# Patient Record
Sex: FEMALE | Race: WHITE | Employment: FULL TIME | URBAN - METROPOLITAN AREA
[De-identification: names, ages, dates, MRNs, and addresses within clinical notes are randomized per-mention and may not be internally consistent; named-entity substitution may affect disease eponyms.]

---

## 2017-01-10 DIAGNOSIS — G89.29 ABDOMINAL PAIN, CHRONIC, GENERALIZED: ICD-10-CM

## 2017-01-10 DIAGNOSIS — G89.29 OTHER CHRONIC PAIN: Primary | ICD-10-CM

## 2017-01-10 DIAGNOSIS — R10.84 ABDOMINAL PAIN, CHRONIC, GENERALIZED: ICD-10-CM

## 2017-01-10 RX ORDER — FENTANYL 25 UG/1
PATCH TRANSDERMAL
Qty: 30 PATCH | Refills: 0 | Status: SHIPPED | OUTPATIENT
Start: 2017-01-10 | End: 2017-01-31

## 2017-01-10 RX ORDER — FENTANYL 50 UG/1
PATCH TRANSDERMAL
Qty: 30 PATCH | Refills: 0 | Status: SHIPPED | OUTPATIENT
Start: 2017-01-10 | End: 2017-01-31

## 2017-01-19 ENCOUNTER — OFFICE VISIT (OUTPATIENT)
Dept: INTERNAL MEDICINE | Facility: CLINIC | Age: 42
End: 2017-01-19

## 2017-01-19 VITALS
WEIGHT: 113 LBS | RESPIRATION RATE: 18 BRPM | DIASTOLIC BLOOD PRESSURE: 80 MMHG | HEART RATE: 85 BPM | BODY MASS INDEX: 19.39 KG/M2 | SYSTOLIC BLOOD PRESSURE: 121 MMHG | TEMPERATURE: 98.1 F | OXYGEN SATURATION: 99 %

## 2017-01-19 DIAGNOSIS — Z79.52 LONG TERM CURRENT USE OF SYSTEMIC STEROIDS: ICD-10-CM

## 2017-01-19 DIAGNOSIS — Z13.820 SCREENING FOR OSTEOPOROSIS: ICD-10-CM

## 2017-01-19 DIAGNOSIS — M85.80 OSTEOPENIA: ICD-10-CM

## 2017-01-19 DIAGNOSIS — K04.7 DENTAL INFECTION: Primary | ICD-10-CM

## 2017-01-19 DIAGNOSIS — K50.118 CROHN'S DISEASE OF LARGE INTESTINE WITH OTHER COMPLICATION (H): ICD-10-CM

## 2017-01-19 DIAGNOSIS — M85.80 BONE LOSS: ICD-10-CM

## 2017-01-19 RX ORDER — FENTANYL 75 UG/1
PATCH TRANSDERMAL
Refills: 0 | COMMUNITY
Start: 2016-10-24 | End: 2017-04-04 | Stop reason: DRUGHIGH

## 2017-01-19 RX ORDER — AMOXICILLIN 500 MG/1
500 TABLET, FILM COATED ORAL 3 TIMES DAILY
Qty: 30 TABLET | Refills: 1 | Status: SHIPPED | OUTPATIENT
Start: 2017-01-19 | End: 2017-02-01

## 2017-01-19 ASSESSMENT — PAIN SCALES - GENERAL: PAINLEVEL: MODERATE PAIN (5)

## 2017-01-19 NOTE — Clinical Note
[facility] ([facility]) Contract: Opioid Prescription  I understand that my provider, ____________________________, is prescribing an opioid medication to assist me in managing my chronic pain and assist me in improving my daily function and activity level. If my activity level or general function changes, the medication may be changed or discontinued. I understand that my provider is not required to prescribe this medication. The risks, side effects, and benefits of taking this medication have been explained to me and I agree to the following conditions related to this treatment. Failure to adhere to these conditions will result in discontinuation of this medication and possible termination of care from the [facility].     I will take my medication as prescribed. I will not change the amount or frequency of the medication without provider approval.    I will only receive these medications from _____________________________________________ (or my provider s designee as determined by my provider).    I am being prescribed opioid medications to treat the following condition(s):________________________________________________.    If I am hospitalized or seen in an emergency department or urgent care for a condition that results in a prescription for another controlled substance (such as anti-anxiety, additional pain medication, sleep aid, or stimulants), I will inform the clinic within one business day of the additional medication.    I will notify the clinic within one business day if I seek any care elsewhere related to this medication.    I will participate in all additional treatments that my provider recommends, such as physical therapy or consultations with a pain or mental health specialist.     I will notify my provider of any history of addiction or current chemical dependence.    I will not use illegal drugs (includes marijuana).     I will comply with random drug screening to confirm  proper use of my medication.    I will comply with state law. I understand that I may not be able to operate a motor vehicle while taking these medications. I will not participate in any activities that will put myself or others at physical risk while taking any medications.    I will not give, sell, or trade my medications to anyone else.      I will not take someone else s medications.    I will not forge or change my prescriptions in any way.    I understand I will have one clinic appointment every __________months (or more frequently) as determined by my provider.    I understand it is my responsibility to schedule future appointments with my provider at the end of each visit.    I will not request early refills. I understand that lost and/or stolen prescriptions/medications will not be replaced.    I will request refills of these medications in the following manner:    o I am responsible to keep track of my medications and plan ahead to arrange for refills. It is my responsibility to ensure that I do not run out of medication.  o I will call [phone] and request a refill of my opioid medication(s).  All other medication refill request should be requested through your pharmacy.  o I will give the clinic one full week notice prior to needing a refill.  o I will not walk in or call into the clinic and request this medication to be refilled same day (I understand I must give a full week notice of the need for refill).  o I understand that these prescriptions will be dispensed monthly with a maximum of one-month supply with no refills.  o Prescriptions will only be available to be picked up during regular office hours (7:30am-5:00pm Monday-Friday). Refills will not be provided at night, on weekends, or during holidays.  I will treat my provider and clinic staff with respect. I will not yell, name-call, shout, or use offensive or vulgar language. I will not threaten or act in an intimidating manner on the  telephone or while in the clinic toward my provider or clinic staff.  I agree if I break this agreement, my physician reserves the right to stop prescribing the controlled substance for me - my medications will be discontinued in a medically safe fashion, and I will not be allowed to get pain medications from any other provider in this clinic and/or it may result in a termination of care from this clinic.       I understand this contract is in effect for all current and future opioid prescriptions received through the [facility].          _________________________________________________________________________                _________________  Signature of Patient or Personal Representative (state relationship)                                        Date    _________________________________________________________________________                 ________________                                              Signature of Provider (also please print name)                                                                               Date

## 2017-01-19 NOTE — NURSING NOTE
Chief Complaint   Patient presents with     Infection     Patient is here to discuss ongoing infection in mouth.      Cookie Armando LPN at 11:09 AM on 1/19/2017.

## 2017-01-19 NOTE — PATIENT INSTRUCTIONS
[facility] Medication Refill Request Information:  * Please contact your pharmacy regarding ANY request for medication refills.  ** [facility] Prescription Fax = [phone]  * Please allow 3 business days for routine medication refills.  * Please allow 5 business days for controlled substance medication refills.     [facility] Test Result notification information:  *You will be notified with in 7-10 days of your appointment day regarding the results of your test.  If you are on MyChart you will be notified as soon as the provider has reviewed the results and signed off on them.

## 2017-01-20 PROBLEM — Z02.89 PAIN MANAGEMENT CONTRACT SIGNED: Status: ACTIVE | Noted: 2017-01-20

## 2017-01-24 NOTE — PROGRESS NOTES
HPI: Tiffani Grubbs is a 41 year old female who comes in 1/19/2017 for mouth infection involving the right bottom molar.  She was told she will need a root canal procedure which is complicated by bone loss of the lower jaw. Her appt is in the future and she was advised to get an antibiotic to treat this until she could get an appt.       Patient Active Problem List   Diagnosis     Crohn's disease (H)     Abdominal pain, chronic, generalized     Anxiety     Depression     Medication refill- do not delete      Nicotine addiction     Thymus hyperplasia (H)     Pain management contract signed       She has a medical hx of  does not have any pertinent problems on file.    Current Outpatient Prescriptions   Medication Sig Dispense Refill     fentaNYL (DURAGESIC) 75 mcg/hr 72 hr patch   0            fentaNYL (DURAGESIC) 25 mcg/hr 72 hr patch Change patches every 24 hrs as needed  Okay to change patches that don't adhere. 30 patch 0     fentaNYL (DURAGESIC) 50 mcg/hr 72 hr patch Change patch every 24  Hours as needed.  Okay to change patches that don't adhere 30 patch 0     oxyCODONE-acetaminophen (PERCOCET) 5-325 MG per tablet Take 1-2 tab by mouth every 6 hours prn breakthrough pain 180 tablet 0     escitalopram (LEXAPRO) 10 MG tablet Take 1 tablet (10 mg) by mouth daily 90 tablet 3     diphenoxylate-atropine (LOMOTIL) 2.5-0.025 MG per tablet Take 2 tablets by mouth 4 times daily as needed 300 tablet 11     prochlorperazine (COMPAZINE) 5 MG tablet Take 0.5 tablets (2.5 mg) by mouth every 6 hours as needed for nausea or vomiting 60 tablet 1     ALPRAZolam (XANAX) 0.5 MG tablet Take 1 tablet (0.5 mg) by mouth 3 times daily as needed for anxiety 30 tablet 5     ketoconazole (NIZORAL) 2 % cream Apply topically 2 times daily 30 g 3     Cholecalciferol (VITAMIN D) 1000 UNITS capsule Take 1 capsule by mouth 2 times daily 90 capsule 3         ALLERGIES: Review of patient's allergies indicates no known allergies.    PAST  MEDICAL HX:   Past Medical History   Diagnosis Date     Crohn's      Chronic pain      Anxiety      Depression      Salivary gland calculi        PAST SURGICAL HX:   Past Surgical History   Procedure Laterality Date      section  3-3-03     Cholecystectomy         IMMUNIZATION HX:   Immunization History   Administered Date(s) Administered     Influenza (IIV3) 11/15/2012, 2016     Influenza (intradermal) 11/15/2015     MMR 1992     Pneumococcal 23 valent 2007     TD (ADULT, 7+) 2006     TDAP (BOOSTRIX AGES 10-64) 2016     Tdap (Adacel,Boostrix) 10/22/2006       SOCIAL HX:   Social History     Social History Narrative    She is single with one daughter, age 11.        ROS:   ENT: swelling of the gum mucosa surrounding the inferior molar on the right. The pain has been constant and excruciating, keeping her awake at night. She has had multiple dental interventions in the past.     OBJECTIVE:  /80 mmHg  Pulse 85  Temp(Src) 98.1  F (36.7  C) (Oral)  Resp 18  Wt 51.256 kg (113 lb)  SpO2 99%  LMP 2017 (Exact Date)  Breastfeeding? No   Wt Readings from Last 1 Encounters:   17 51.256 kg (113 lb)     Constitutional: no distress, comfortable, pleasant   Eyes: anicteric  Cardiovascular: /80 mmHg  Pulse 85   Respiratory: no distress.  Skin: thinned skin on the arms with redness and shiny appearance.   Neurological:normal speech, no tremor   Psychological: appropriate mood   LYMPH: right tonsillar node palpable and slightly tender.  No  supraclavicular, infraclavicular or inguinal nodes.    ASSESSMENT/PLAN:  Tiffani was seen today for infection.    Diagnoses and all orders for this visit:    Dental infection  -     amoxicillin (AMOXIL) 500 MG tablet; Take 1 tablet (500 mg) by mouth 3 times daily    Crohn's disease of large intestine with other complication (H)    Screening for osteoporosis       -     Will order a Dexa    Long term current use of systemic  steroids   -     Dexa hip/pelvis/spine; Future      Total time spent 15 minutes.  More than 50% of the time spent with Ms. Grubbs on counseling / coordinating her care    Ghada DODSON, CNP

## 2017-01-27 ENCOUNTER — MYC MEDICAL ADVICE (OUTPATIENT)
Dept: INTERNAL MEDICINE | Facility: CLINIC | Age: 42
End: 2017-01-27

## 2017-01-27 DIAGNOSIS — K04.7 DENTAL INFECTION: Primary | ICD-10-CM

## 2017-01-31 DIAGNOSIS — G89.29 OTHER CHRONIC PAIN: Primary | ICD-10-CM

## 2017-01-31 DIAGNOSIS — R10.84 ABDOMINAL PAIN, CHRONIC, GENERALIZED: ICD-10-CM

## 2017-01-31 DIAGNOSIS — G89.29 ABDOMINAL PAIN, CHRONIC, GENERALIZED: ICD-10-CM

## 2017-01-31 RX ORDER — FENTANYL 50 UG/1
PATCH TRANSDERMAL
Qty: 30 PATCH | Refills: 0 | Status: SHIPPED | OUTPATIENT
Start: 2017-01-31 | End: 2017-02-22

## 2017-01-31 RX ORDER — FENTANYL 25 UG/1
PATCH TRANSDERMAL
Qty: 30 PATCH | Refills: 0 | Status: SHIPPED | OUTPATIENT
Start: 2017-01-31 | End: 2017-02-22

## 2017-01-31 NOTE — TELEPHONE ENCOUNTER
Reason For Call:   Chief Complaint   Patient presents with     Refill Request     Fentanyl       Medication Name, Dose and Monthly Quantity:   fentaNYL (DURAGESIC) 25 mcg/hr 72 hr patch,Sig:  Change patches every 24 hrs as needed #30  Okay to change patches that don't adhere  Medication Name, Dose and Monthly Quantity:  fentaNYL (DURAGESIC) 50 mcg/hr 72 hr patch 30 patch 0 1/10/2017       Sig:  Change patch every 24  Hours as needed.   Okay to change patches that don't adhere       Medication Name, Dose and Monthly Quantity:    Medication Name, Dose and Monthly Quantity:      Diagnosis requiring opiates:   yes    Problem List Updated:   Yes    Opioid Agreement On File - [facility] PAIN CONTRACT ID# [id number]:  Yes    Last Urine Drug Screen (at least once every 12 months) Date:   None    Unexpected Results:   No.    [place]  Data Reviewed (at least once every 3 months) Date:   1/31/17   Unexpected Results:    No.    Last Fill Date:   1/10/17    Due Date:   3/2/17    Last Visit with PCP:   1/19/17    Future Visits with PCP:   No.    Processing:   Drop box.    Nathaly Bonner RN

## 2017-02-01 RX ORDER — AMOXICILLIN 500 MG/1
500 TABLET, FILM COATED ORAL 3 TIMES DAILY
Qty: 30 TABLET | Refills: 0 | Status: SHIPPED | OUTPATIENT
Start: 2017-02-01 | End: 2017-05-30

## 2017-02-09 ENCOUNTER — TELEPHONE (OUTPATIENT)
Dept: INTERNAL MEDICINE | Facility: CLINIC | Age: 42
End: 2017-02-09

## 2017-02-09 DIAGNOSIS — G89.29 OTHER CHRONIC PAIN: Primary | ICD-10-CM

## 2017-02-09 RX ORDER — OXYCODONE AND ACETAMINOPHEN 5; 325 MG/1; MG/1
TABLET ORAL
Qty: 180 TABLET | Refills: 0 | Status: SHIPPED | OUTPATIENT
Start: 2017-02-09 | End: 2017-03-14

## 2017-02-09 NOTE — TELEPHONE ENCOUNTER
----- Message from Lucero Tejeda sent at 2/9/2017  9:45 AM CST -----  Regarding: CONTROL SUBSTANCE MEDICATION REFILL  Contact: [phone]  Pt called in to get refill of PERCOCET, please send rx to pt home.     Thank you     Lucero Tejeda  Intake representative  Call Center

## 2017-02-10 DIAGNOSIS — F41.9 ANXIETY: Primary | ICD-10-CM

## 2017-02-14 RX ORDER — ALPRAZOLAM 0.5 MG
0.5 TABLET ORAL 3 TIMES DAILY PRN
Qty: 30 TABLET | Refills: 5 | Status: SHIPPED | OUTPATIENT
Start: 2017-02-14 | End: 2017-08-21

## 2017-02-22 ENCOUNTER — MYC REFILL (OUTPATIENT)
Dept: INTERNAL MEDICINE | Facility: CLINIC | Age: 42
End: 2017-02-22

## 2017-02-22 DIAGNOSIS — R10.84 ABDOMINAL PAIN, CHRONIC, GENERALIZED: ICD-10-CM

## 2017-02-22 DIAGNOSIS — G89.29 ABDOMINAL PAIN, CHRONIC, GENERALIZED: ICD-10-CM

## 2017-02-22 DIAGNOSIS — G89.29 OTHER CHRONIC PAIN: ICD-10-CM

## 2017-02-22 NOTE — TELEPHONE ENCOUNTER
Reason For Call:   Chief Complaint   Patient presents with     Refill Request     fentanyl       Medication Name, Dose and Monthly Quantity:   fentaNYL (DURAGESIC) 25 mcg/hr 72 hr patch,Sig:  Change patches every 24 hrs as needed #30  Okay to change patches that don't adhere  Medication Name, Dose and Monthly Quantity:  fentaNYL (DURAGESIC) 50 mcg/hr 72 hr patch 30 patch 0 1/10/2017       Sig:  Change patch every 24  Hours as needed.   Okay to change patches that don't adhere       Medication Name, Dose and Monthly Quantity:    Medication Name, Dose and Monthly Quantity:      Diagnosis requiring opiates:   yes    Problem List Updated:   Yes    Opioid Agreement On File - [facility] PAIN CONTRACT ID# [id number]:  Yes    Last Urine Drug Screen (at least once every 12 months) Date:   None    Unexpected Results:   No.    [place]  Data Reviewed (at least once every 3 months) Date:   1/31/17   Unexpected Results:    No.    Last Fill Date:   1/31/2017    Due Date:   3/2/2017    Last Visit with PCP:   1/19/17    Future Visits with PCP:   No.    Processing:   Drop box.    yNa Rojas LPN

## 2017-02-23 RX ORDER — FENTANYL 50 UG/1
PATCH TRANSDERMAL
Qty: 30 PATCH | Refills: 0 | Status: SHIPPED | OUTPATIENT
Start: 2017-02-23 | End: 2017-03-14

## 2017-02-23 RX ORDER — FENTANYL 50 UG/1
PATCH TRANSDERMAL
Qty: 30 PATCH | Refills: 0 | Status: SHIPPED | OUTPATIENT
Start: 2017-03-02 | End: 2017-02-23

## 2017-02-23 RX ORDER — FENTANYL 25 UG/1
PATCH TRANSDERMAL
Qty: 30 PATCH | Refills: 0 | Status: SHIPPED | OUTPATIENT
Start: 2017-02-23 | End: 2017-03-14

## 2017-02-23 RX ORDER — FENTANYL 25 UG/1
PATCH TRANSDERMAL
Qty: 30 PATCH | Refills: 0 | Status: SHIPPED | OUTPATIENT
Start: 2017-03-02 | End: 2017-02-23

## 2017-02-23 NOTE — TELEPHONE ENCOUNTER
Call from patient. Due to changing patches every 24 hours, and patches not adhering at times, patient said that her prescriptions don't last a full month, so the start date on the Fentanyl prescriptions that she picked up today are incorrect. Discussed with Ghada Scruggs NP, and reviewed prescription history.     New prescriptions pended.

## 2017-03-14 ENCOUNTER — MYC REFILL (OUTPATIENT)
Dept: INTERNAL MEDICINE | Facility: CLINIC | Age: 42
End: 2017-03-14

## 2017-03-14 DIAGNOSIS — G89.29 ABDOMINAL PAIN, CHRONIC, GENERALIZED: ICD-10-CM

## 2017-03-14 DIAGNOSIS — R10.84 ABDOMINAL PAIN, CHRONIC, GENERALIZED: ICD-10-CM

## 2017-03-14 DIAGNOSIS — G89.29 OTHER CHRONIC PAIN: ICD-10-CM

## 2017-03-16 RX ORDER — OXYCODONE AND ACETAMINOPHEN 5; 325 MG/1; MG/1
TABLET ORAL
Qty: 180 TABLET | Refills: 0 | Status: SHIPPED | OUTPATIENT
Start: 2017-03-16 | End: 2017-04-20

## 2017-03-16 RX ORDER — FENTANYL 25 UG/1
PATCH TRANSDERMAL
Qty: 30 PATCH | Refills: 0 | Status: SHIPPED | OUTPATIENT
Start: 2017-03-16 | End: 2017-04-04

## 2017-03-16 RX ORDER — FENTANYL 50 UG/1
PATCH TRANSDERMAL
Qty: 30 PATCH | Refills: 0 | Status: SHIPPED | OUTPATIENT
Start: 2017-03-16 | End: 2017-04-04

## 2017-03-16 NOTE — TELEPHONE ENCOUNTER
Reason For Call:   Chief Complaint   Patient presents with     Refill Request     percocet, fentanyl       Medication Name, Dose and Monthly Quantity:   fentaNYL (DURAGESIC) 25 mcg/hr 72 hr patch,Sig:  Change patches every 24 hrs as needed #30  Okay to change patches that don't adhere  Medication Name, Dose and Monthly Quantity:  fentaNYL (DURAGESIC) 50 mcg/hr 72 hr patch 30 patch 0 1/10/2017       Sig:  Change patch every 24  Hours as needed.   Okay to change patches that don't adhere       Medication Name, Dose and Monthly Quantity:    Medication Name, Dose and Monthly Quantity:      Diagnosis requiring opiates:   yes    Problem List Updated:   Yes    Opioid Agreement On File - [facility] PAIN CONTRACT ID# [id number]:  Yes    Last Urine Drug Screen (at least once every 12 months) Date:   None    Unexpected Results:   No.    [place]  Data Reviewed (at least once every 3 months) Date:   1/31/17   Unexpected Results:    No.    Last Fill Date:   2/23/2017    Due Date:   4/16/2017    Last Visit with PCP:   1/19/17    Future Visits with PCP:   No.    Processing:   Drop box.    Nathaly Bonner RN      Reason For Call:   Chief Complaint   Patient presents with     Refill Request     percocet, fentanyl       Medication Name, Dose and Monthly Quantity:      Disp Refills Start End GENOVEVA   oxyCODONE-acetaminophen (PERCOCET) 5-325 MG per table 18       Take 1-2 tabs by mouth every 6 hrs #180        Diagnosis requiring opiates:   Chronic pain    Problem List Updated:   Yes    Opioid Agreement On [facility] PAIN CONTRACT ID# [id number]:  Yes    Last Urine Drug Screen (at least once every 12 months) Date:   none  Unexpected Results:   No.    [place]  Data Reviewed (at least once every 3 months) Date:   no   Unexpected Results:    no    Last Fill Date:   2/9/2017    Due Date:   4/15/17    Last Visit with PCP:   1/19/17    Future Visits with PCP:   No.    Processing:   Drop box.    Nathaly Bonner RN

## 2017-03-16 NOTE — TELEPHONE ENCOUNTER
Message from Brynn:  Carol Modi RN Wed Mar 15, 2017 9:04 AM        ----- Message -----   From: Tiffani Grubbs   Sent: 3/14/2017 5:25 PM   To: [facility] Nursing Staff-  Subject: Medication Renewal Request     Original authorizing provider: IVORY Luz CNP would like a refill of the following medications:  oxyCODONE-acetaminophen (PERCOCET) 5-325 MG per tablet [IVORY Luz CNP]  fentaNYL (DURAGESIC) 25 mcg/hr 72 hr patch [IVORY Luz CNP]  fentaNYL (DURAGESIC) 50 mcg/hr 72 hr patch [IVORY Luz CNP]    Preferred pharmacy: Other -     Comment:

## 2017-04-04 DIAGNOSIS — G89.29 OTHER CHRONIC PAIN: ICD-10-CM

## 2017-04-04 DIAGNOSIS — G89.29 ABDOMINAL PAIN, CHRONIC, GENERALIZED: ICD-10-CM

## 2017-04-04 DIAGNOSIS — R10.84 ABDOMINAL PAIN, CHRONIC, GENERALIZED: ICD-10-CM

## 2017-04-04 RX ORDER — FENTANYL 50 UG/1
PATCH TRANSDERMAL
Qty: 30 PATCH | Refills: 0 | Status: SHIPPED | OUTPATIENT
Start: 2017-04-04 | End: 2017-04-20

## 2017-04-04 RX ORDER — FENTANYL 25 UG/1
PATCH TRANSDERMAL
Qty: 30 PATCH | Refills: 0 | Status: SHIPPED | OUTPATIENT
Start: 2017-04-04 | End: 2017-04-20

## 2017-04-20 DIAGNOSIS — R10.84 ABDOMINAL PAIN, CHRONIC, GENERALIZED: ICD-10-CM

## 2017-04-20 DIAGNOSIS — G89.29 ABDOMINAL PAIN, CHRONIC, GENERALIZED: ICD-10-CM

## 2017-04-20 DIAGNOSIS — G89.29 OTHER CHRONIC PAIN: ICD-10-CM

## 2017-04-20 RX ORDER — FENTANYL 50 UG/1
PATCH TRANSDERMAL
Qty: 30 PATCH | Refills: 0 | Status: SHIPPED | OUTPATIENT
Start: 2017-04-20 | End: 2017-05-11

## 2017-04-20 RX ORDER — OXYCODONE AND ACETAMINOPHEN 5; 325 MG/1; MG/1
TABLET ORAL
Qty: 180 TABLET | Refills: 0 | Status: SHIPPED | OUTPATIENT
Start: 2017-04-20 | End: 2017-05-26

## 2017-04-20 RX ORDER — FENTANYL 25 UG/1
PATCH TRANSDERMAL
Qty: 30 PATCH | Refills: 0 | Status: SHIPPED | OUTPATIENT
Start: 2017-04-20 | End: 2017-05-11

## 2017-05-11 DIAGNOSIS — G89.29 ABDOMINAL PAIN, CHRONIC, GENERALIZED: ICD-10-CM

## 2017-05-11 DIAGNOSIS — R10.84 ABDOMINAL PAIN, CHRONIC, GENERALIZED: ICD-10-CM

## 2017-05-11 DIAGNOSIS — G89.29 OTHER CHRONIC PAIN: ICD-10-CM

## 2017-05-11 RX ORDER — FENTANYL 25 UG/1
PATCH TRANSDERMAL
Qty: 30 PATCH | Refills: 0 | Status: SHIPPED | OUTPATIENT
Start: 2017-05-11 | End: 2017-05-25

## 2017-05-11 RX ORDER — FENTANYL 50 UG/1
PATCH TRANSDERMAL
Qty: 30 PATCH | Refills: 0 | Status: SHIPPED | OUTPATIENT
Start: 2017-05-11 | End: 2017-05-25

## 2017-05-12 ENCOUNTER — TELEPHONE (OUTPATIENT)
Dept: INTERNAL MEDICINE | Facility: CLINIC | Age: 42
End: 2017-05-12

## 2017-05-12 NOTE — TELEPHONE ENCOUNTER
----- Message from Jackson Gill sent at 5/11/2017  4:07 PM CDT -----  Regarding: Prescription   Contact: [phone]  Patient called stating NP Ghada Scruggs is aware of the refill with her fetanyl. Requesting for a call back from a nurse so she can get her prescription refilled.     This is regarding her fetanyl patches.       5/12/17 I called and spoke with pharmacist. Okay to refill fentanyl patches today per Ghada. Earnestine BLAIR

## 2017-05-25 ENCOUNTER — DOCUMENTATION ONLY (OUTPATIENT)
Dept: INTERNAL MEDICINE | Facility: CLINIC | Age: 42
End: 2017-05-25

## 2017-05-25 DIAGNOSIS — R10.84 ABDOMINAL PAIN, CHRONIC, GENERALIZED: ICD-10-CM

## 2017-05-25 DIAGNOSIS — K50.918 CROHN'S DISEASE WITH OTHER COMPLICATION: Primary | ICD-10-CM

## 2017-05-25 DIAGNOSIS — G89.29 ABDOMINAL PAIN, CHRONIC, GENERALIZED: ICD-10-CM

## 2017-05-25 DIAGNOSIS — G89.29 OTHER CHRONIC PAIN: ICD-10-CM

## 2017-05-25 RX ORDER — FENTANYL 75 UG/1
PATCH TRANSDERMAL
Qty: 30 PATCH | Refills: 0
Start: 2017-06-11 | End: 2017-05-30

## 2017-05-25 RX ORDER — FENTANYL 25 UG/1
PATCH TRANSDERMAL
Qty: 30 PATCH | Refills: 0
Start: 2017-06-11 | End: 2017-05-30

## 2017-05-25 NOTE — PROGRESS NOTES
"Gi Leija, Ghada Marlow CNP, IVORY CNP Cc: Carol Dumont, RN; Alicja Tang, ROSSY Nixon,  I had a long discussion with Edy, the pharmacist at [facility].    He states we need to be clarifying that we are prescribing these amounts: 1. ) for legitimate medical necessity and not to treat symptoms of withdrawal. 2.) Also describing how we are monitoring your patch use and cannot write prescriptions stating that you need to replace a patch when needed.  The rx would need to say take 1 time a day or 2 x a day or whatever is prescribed and then if they fall off early deal with each incident individually. Patches would need to be counted to make sure they are not being sold.   3.) I need to legitimize that I am practicing within my scope of practice.    4.) He also said he contacted the [employer] company and they said they had \"overlays\" for people who have problems with patches sticking and these are free for people who contact them at [phone] option #4.      He states you were given your usual fill on 4/4 and then  an early fill for 30 patches of each strength before your oral procedure and then another early fill on 5/12.    In the meantime I received a response from the gastroenterology appt which sounds very hopeful.  See the response below.     I am putting in a referral for you to the Inflammatory Bowel Disease, so Dr. Merritt, Dr. De León or Dr. Albright.  I think this will be very beneficial for you. They should be calling you to schedule but if they do not please call [phone] to schedule.    I am very hopeful this will be helpful.  It would be nice to reduce your costs and your pain.  They would also help you with withdrawing from the Fentanyl if that is indicated.     Ghada DODSON, JAMES Urr[place]ia,   Yes, we have people who work to get patients approved for medication assistance programs and similar.   She has also been off medication(s) for so long, they may work " "again.     We have an Inflammatory Bowel Disease group now, and I no longer see Inflammatory Bowel Disease persons (generally, but anything else in luminal GI, as does Dr. Cierra Mane ). The Inflammatory Bowel Disease physicians are Adonis Merritt, Antoni De León, Carl Albright. There are special nurse coordinators as well; Carol Dumont and Alicja Tang.  I have removed the permanent commend in the chart that read \"Dr. Maurisio Lopez\", as he left the [facility] three years ago and has moved out of [place].     This woman has not been seen here in GI since 2009, though you have entered referral and it has been discussed and there have been phone calls with the location, dating back to 2013.     We can do nothing until she is seen.     I am forwarding this to the coordinators to get started.     Gi Husain APRN CNP Overkamp, Monica M, APRN CNP Cc: Carol Dumont RN; Alicja Tang RN          Previous Messages      ----- Message -----      From: Ghada Scruggs APRN CNP      Sent: 5/25/2017  10:12 AM        To: IVORY Ballesteros CNP   Subject: Crohn                                           [place],   I have a favor to ask you.  Tiffani Grubbs has Crohns and way back in 2010 Cimzea was not covered by Medicare.  She does not have Part D and was applying for \"need program\" to the \" but has not been able to get it.  She developed sensitivity to Humera and Remicade so it's not a viable med any longer for her.  So she has been using fentanyl patches which she has to pay out of pocket for.  She has a skin condition where the patches do not stick well and she often needs to replace a patch when it falls off or she starts going through withdrawal.     Do you know off hand if there are any other biologics available that her insurance might cover?  She would consider buying Part D if it covered her medication as the medication is expensive.     I don't think she is abusing the Fentanyl but it is an " alarming amt and it is costing her alot.  Is there anyone in your department who works with these issues who might be able to help her?     Thanks.     Ghada DODSON, CNP

## 2017-05-26 DIAGNOSIS — G89.29 OTHER CHRONIC PAIN: ICD-10-CM

## 2017-05-26 RX ORDER — OXYCODONE AND ACETAMINOPHEN 5; 325 MG/1; MG/1
TABLET ORAL
Qty: 180 TABLET | Refills: 0 | Status: SHIPPED | OUTPATIENT
Start: 2017-05-26 | End: 2017-06-08

## 2017-05-26 NOTE — TELEPHONE ENCOUNTER
Pt called ,requesting to get a refill on Percocet,last refill 4/20/17 #180.  Pt said she has 20 tablets left, but getting low on the Fentanyl patches(has enough till Tuesday if none them fall off, having issues with staying on)   Discussed with Ghada Scruggs, given verbal authorization for the refill.  Rx routed to  for review and authorization.  Nathaly Bonner RN  ----------  Rx authorized by , taken to drop box for pt .  Pt notified.  Nathaly Bonner RN

## 2017-05-30 ENCOUNTER — OFFICE VISIT (OUTPATIENT)
Dept: INTERNAL MEDICINE | Facility: CLINIC | Age: 42
End: 2017-05-30

## 2017-05-30 VITALS
SYSTOLIC BLOOD PRESSURE: 136 MMHG | WEIGHT: 110.9 LBS | HEART RATE: 125 BPM | DIASTOLIC BLOOD PRESSURE: 78 MMHG | BODY MASS INDEX: 19.03 KG/M2

## 2017-05-30 DIAGNOSIS — G89.29 ABDOMINAL PAIN, CHRONIC, GENERALIZED: ICD-10-CM

## 2017-05-30 DIAGNOSIS — R10.84 ABDOMINAL PAIN, CHRONIC, GENERALIZED: ICD-10-CM

## 2017-05-30 DIAGNOSIS — R21 RASH AND NONSPECIFIC SKIN ERUPTION: Primary | ICD-10-CM

## 2017-05-30 RX ORDER — FENTANYL 75 UG/1
PATCH TRANSDERMAL
Qty: 12 PATCH | Refills: 0 | Status: SHIPPED | OUTPATIENT
Start: 2017-06-11 | End: 2017-06-06

## 2017-05-30 RX ORDER — FENTANYL 50 UG/1
PATCH TRANSDERMAL
Refills: 0 | COMMUNITY
Start: 2017-05-12 | End: 2017-05-30

## 2017-05-30 NOTE — MR AVS SNAPSHOT
After Visit Summary   5/30/2017    Tiffani Grubbs    MRN: [id number]           Patient Information     Date Of Birth          1975        Visit Information        Provider Department      5/30/2017 11:25 AM Ghada Scruggs, APRN CNP [facility]        Today's Diagnoses     Rash and nonspecific skin eruption    -  1    Abdominal pain, chronic, generalized          Care Instructions    Primary Care Center Medication Refill Request Information:  * Please contact your pharmacy regarding ANY request for medication refills.  ** [facility] Prescription Fax = [phone]  * Please allow 3 business days for routine medication refills.  * Please allow 5 business days for controlled substance medication refills.     Primary Care Center Test Result notification information:  *You will be notified with in 7-10 days of your appointment day regarding the results of your test.  If you are on MyChart you will be notified as soon as the provider has reviewed the results and signed off on them.    Primary Care Center [phone]             Follow-ups after your visit        Additional Services     DERMATOLOGY REFERRAL       Your provider has referred you to: [facility]  Dermatology for rash and mole on back    Please be aware that coverage of these services is subject to the terms and limitations of your health insurance plan.  Call member services at your health plan with any benefit or coverage questions.      Please bring the following with you to your appointment:    (1) Any X-Rays, CTs or MRIs which have been performed.  Contact the facility where they were done to arrange for  prior to your scheduled appointment.    (2) List of current medications  (3) This referral request   (4) Any documents/labs given to you for this referral                  Your next 10 appointments already scheduled     Jul 03, 2017 10:30 AM CDT   (Arrive by 10:15 AM)   RETURN IRRITABLE BOWEL DISEASE with Carl Cartagena  MD Jose Ramon   [facility] Gastroenterology and IBD ([facility] [[email or web address] Surgery [place])    [address] [address] [phone]              Who to contact     Please call your clinic at [phone] to:    Ask questions about your health    Make or cancel appointments    Discuss your medicines    Learn about your test results    Speak to your doctor   If you have compliments or concerns about an experience at your clinic, or if you wish to file a complaint, please contact [facility] Patient Relations at [phone] or email us at [email or web address]         Additional Information About Your Visit        [employer] Information     [employer] gives you secure access to your electronic health record. If you see a primary care provider, you can also send messages to your care team and make appointments. If you have questions, please call your primary care clinic.  If you do not have a primary care provider, please call [phone] and they will assist you.      [employer] is an electronic gateway that provides easy, online access to your medical records. With [employer], you can request a clinic appointment, read your test results, renew a prescription or communicate with your care team.     To access your existing account, please contact your [facility] or call [phone] for assistance.        Care EveryWhere ID     This is your Care EveryWhere ID. This could be used by other organizations to access your [place] medical records  [id number]        Your Vitals Were     Pulse BMI (Body Mass Index)                125 19.03 kg/m2           Blood Pressure from Last 3 Encounters:   06/06/17 120/84   05/30/17 136/78   01/19/17 121/80    Weight from Last 3 Encounters:   06/06/17 50 kg (110 lb 3.2 oz)   05/30/17 50.3 kg (110 lb 14.4 oz)   01/19/17 51.3 kg (113 lb)              We Performed the Following     DERMATOLOGY REFERRAL           Today's Medication Changes          These changes are accurate as of: 5/30/17 11:59 PM.  If you have any questions, ask your nurse or doctor.               Stop taking these medicines if you haven't already. Please contact your care team if you have questions.     amoxicillin 500 MG tablet   Commonly known as:  AMOXIL   Stopped by:  Ghada Scruggs APRN CNP                Where to get your medicines      Some of these will need a paper prescription and others can be bought over the counter.  Ask your nurse if you have questions.     Bring a paper prescription for each of these medications     fentaNYL 75 mcg/hr 72 hr patch                Primary Care Provider Office Phone # Fax #    IVORY Bradford [phone]       PRIMARY CARE CENTER [address] [place] [address] [address]  [facility] [address]        Thank you!     Thank you for choosing [facility]  for your care. Our goal is always to provide you with excellent care. Hearing back from our patients is one way we can continue to improve our services. Please take a few minutes to complete the written survey that you may receive in the mail after your visit with us. Thank you!             Your Updated Medication List - Protect others around you: Learn how to safely use, store and throw away your medicines at www.disposemymeds.org.          This list is accurate as of: 5/30/17 11:59 PM.  Always use your most recent med list.                   Brand Name Dispense Instructions for use    ALPRAZolam 0.5 MG tablet    XANAX    30 tablet    Take 1 tablet (0.5 mg) by mouth 3 times daily as needed for anxiety       escitalopram 10 MG tablet    LEXAPRO    90 tablet    Take 1 tablet (10 mg) by mouth daily       fentaNYL 75 mcg/hr 72 hr patch    DURAGESIC    12 patch    1 patch every 24 hours.       prochlorperazine 5 MG tablet    COMPAZINE    60 tablet    Take 0.5 tablets (2.5 mg) by mouth every 6 hours as needed for nausea or vomiting

## 2017-05-30 NOTE — PATIENT INSTRUCTIONS
[facility] Medication Refill Request Information:  * Please contact your pharmacy regarding ANY request for medication refills.  ** [facility] Prescription Fax = [phone]  * Please allow 3 business days for routine medication refills.  * Please allow 5 business days for controlled substance medication refills.     [facility] Test Result notification information:  *You will be notified with in 7-10 days of your appointment day regarding the results of your test.  If you are on MyChart you will be notified as soon as the provider has reviewed the results and signed off on them.    [facility] [phone]

## 2017-06-01 ENCOUNTER — DOCUMENTATION ONLY (OUTPATIENT)
Dept: INTERNAL MEDICINE | Facility: CLINIC | Age: 42
End: 2017-06-01

## 2017-06-01 ENCOUNTER — CARE COORDINATION (OUTPATIENT)
Dept: INTERNAL MEDICINE | Facility: CLINIC | Age: 42
End: 2017-06-01

## 2017-06-01 ENCOUNTER — TELEPHONE (OUTPATIENT)
Dept: INTERNAL MEDICINE | Facility: CLINIC | Age: 42
End: 2017-06-01

## 2017-06-01 ENCOUNTER — MYC MEDICAL ADVICE (OUTPATIENT)
Dept: INTERNAL MEDICINE | Facility: CLINIC | Age: 42
End: 2017-06-01

## 2017-06-01 NOTE — TELEPHONE ENCOUNTER
----- Message from Fernando Rousseau MD sent at 6/1/2017 12:09 PM CDT -----  Regarding: RE: pt management question  Ghada,  She may be a good candidate for buprenorphine as that is a sub-lingual medication .  To transition from Fentanyl would require an in-patient detox stay of a few days.  The Intake # to arrange this is [phone].    Donal Rousseau  ----- Message -----     From: Ghada Scruggs APRN CNP     Sent: 6/1/2017  11:09 AM       To: Fernando Rousseau MD  Subject: pt management question                           I was referred to you  (round about)  by Nadir Aldana who states you would be an excellent resource.  I am an NP in the Primary Care Clinic at the  and have inherited a pt. with Crohn's disease who did not qualify through insurance for biologics. She was managed for years with Fentanyl patches.  She has a skin condition and the patches do not adhere and she has tape allergies. Her fentanyl patch use is escalating and she needs to find another option.     The  now has a new GI clinic for people with Crohns and they have a  and  person so I am in the process of getting her scheduled with them as the fentanyl patch delivery system is not working for her.  She states that in the past oral narcotics were not absorbed through her gut wall.(?)  She is not going to wean off these narcotics easily and this is out of my league.  How can I arrange for her to be assessed in your clinic in a timely manner as the pharmacies are really pushing to cut back on her Fentanyl prescription.     Your advice with this would be greatly appreciated.      Ghada DODSON, CNP

## 2017-06-01 NOTE — PROGRESS NOTES
HPI: Tiffani Grubbs is a 41 year old female who comes in to discuss he analgesic therapy for her Crohn's disease. 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 Her  Fentylyn  patches were having issues with adherence due to the pool exposure on her underlying skin condition.    She has been meeting resist ence with her [facility] Pharmacist regarding amounts and refills.   An inquiring to GI as made regarding possible consultation for Crohns and biologics she could afford.  I have received info that a new clinic has  been established with a  for medication access.  Pt is interested in this as she  is paying out of pocket for her current Fentanyl patches.   She states if she is off her patch more  than a few hours she develops sweating and tachycardia.    Patient Active Problem List   Diagnosis     Crohn's disease (H)     Abdominal pain, chronic, generalized     Anxiety     Depression     Medication refill- do not delete      Nicotine addiction     Thymus hyperplasia (H)     Pain management contract signed       She has a medical hx of  does not have any pertinent problems on file.    Current Outpatient Prescriptions   Medication Sig Dispense Refill     [START ON 6/11/2017] fentaNYL (DURAGESIC) 75 mcg/hr 72 hr patch 1 patch every 24 hours. 12 patch 0     oxyCODONE-acetaminophen (PERCOCET) 5-325 MG per tablet Take 1-2 tab by mouth every 6 hours prn breakthrough pain 180 tablet 0     ALPRAZolam (XANAX) 0.5 MG tablet Take 1 tablet (0.5 mg) by mouth 3 times daily as needed for anxiety 30 tablet 5     escitalopram (LEXAPRO) 10 MG tablet Take 1 tablet (10 mg) by mouth daily 90 tablet 3     diphenoxylate-atropine (LOMOTIL) 2.5-0.025 MG per tablet Take 2 tablets by mouth 4 times daily as needed 300 tablet 11     prochlorperazine (COMPAZINE) 5 MG tablet Take 0.5 tablets (2.5 mg) by mouth every 6 hours as needed for nausea or vomiting 60 tablet 1      Cholecalciferol (VITAMIN D) 1000 UNITS capsule Take 1 capsule by mouth 2 times daily 90 capsule 3         ALLERGIES: Review of patient's allergies indicates no known allergies.    PAST MEDICAL HX:   Past Medical History:   Diagnosis Date     Anxiety      Chronic pain      Crohn's      Depression      Salivary gland calculi        PAST SURGICAL HX:   Past Surgical History:   Procedure Laterality Date      SECTION  3-3-03     CHOLECYSTECTOMY         IMMUNIZATION HX:   Immunization History   Administered Date(s) Administered     Influenza (IIV3) 11/15/2012, 2016     Influenza (intradermal) 11/15/2015     MMR 1992     Pneumococcal 23 valent 2007     TD (ADULT, 7+) 2006     TDAP Vaccine (Boostrix) 2016     Tdap (Adacel,Boostrix) 10/22/2006       SOCIAL HX:   Social History     Social History Narrative    She is single with one daughter, age 11.      ROS: 4 system ROS reviewed w/o changes except for above    OBJECTIVE:  /78 (BP Location: Right arm, Patient Position: Chair, Cuff Size: Adult Regular)  Pulse 125  Wt 50.3 kg (110 lb 14.4 oz)  BMI 19.03 kg/m2   Wt Readings from Last 1 Encounters:   17 50.3 kg (110 lb 14.4 oz)     Constitutional: no distress, comfortable, pleasant   Eyes: anicteric  Cardiovascular: regular rate and rhythm, normal S1 and S2, no murmurs, rubs or gallops, peripheral pulses full and symmetric   Respiratory: clear to auscultation, no wheezes or crackles, normal breath sounds    Musculoskeletal: full range of motion, no edema   Skin: bilateral upper arms red macules, also over upper back.  Neurological: normal gait,normal speech, no tremor   Psychological: appropriate mood       ASSESSMENT/PLAN:  Tiffani was seen today for medication request.    Diagnoses and all orders for this visit:    Rash and nonspecific skin eruption  -     DERMATOLOGY REFERRAL for skin condition of her proximal bilateral arms and her back.    Abdominal pain, chronic,  generalized  -     fentaNYL (DURAGESIC) 75 mcg/hr 72 hr patch; 1 patch every 24 hours  I a hopeful the they can help develop a plan for treatment of Crohns with an affordable treatment so she can be refer for detox of her fentanyl patches.  Consider referral to    [facility] for assistance with weaning.       Total time spent 25 minutes.  More than 50% of the time spent with Ms. Grubbs on counseling / coordinating her care    Ghada DODSON, CNP

## 2017-06-01 NOTE — PROGRESS NOTES
RE: pt management question  Received: Fernando Bowling MD Overkamp, Monica M, APRN CNP                   Ghada,   She may be a good candidate for buprenorphine as that is a sub-lingual medication .  To transition from Fentanyl would require an in-patient detox stay of a few days.  The Intake # to arrange this is [phone].     Donal Rousseau            Previous Messages       ----- Message -----      From: Ghada Scruggs APRN CNP      Sent: 6/1/2017  11:09 AM        To: Fernando Rousseau MD   Subject: pt management question                           I was referred to you  (round about)  by Nadir Aldana who states you would be an excellent resource.  I am an NP in the Primary Care Clinic at the  and have inherited a pt. with Crohn's disease who did not qualify through insurance for biologics. She was managed for years with Fentanyl patches.  She has a skin condition and the patches do not adhere and she has tape allergies. Her fentanyl patch use is escalating and she needs to find another option.     The  now has a new GI clinic for people with Crohns and they have a  and  person so I am in the process of getting her scheduled with them as the fentanyl patch delivery system is not working for her.  She states that in the past oral narcotics were not absorbed through her gut wall.(?)   She is not going to wean off these narcotics easily and this is out of my league.  How can I arrange for her to be assessed in your clinic in a timely manner as the pharmacies are really pushing to cut back on her Fentanyl prescription.     Your advice with this would be greatly appreciated.       Ghada DODSON CNP

## 2017-06-01 NOTE — TELEPHONE ENCOUNTER
RE: pt management question  Received: Fernando Bowling MD Overkamp, Monica M, APRN CNP                   Ghada,   She may be a good candidate for buprenorphine as that is a sub-lingual medication .  To transition from Fentanyl would require an in-patient detox stay of a few days.  The Intake # to arrange this is [phone].     Donal Rousseau            Previous Messages       ----- Message -----      From: Ghada Scruggs APRN CNP      Sent: 6/1/2017  11:09 AM        To: Fernando Rousseau MD   Subject: pt management question                           I was referred to you  (round about)  by Nadir Aldana who states you would be an excellent resource.  I am an NP in the Primary Care Clinic at the  and have inherited a pt. with Crohn's disease who did not qualify through insurance for biologics. She was managed for years with Fentanyl patches.  She has a skin condition and the patches do not adhere and she has tape allergies. Her fentanyl patch use is escalating and she needs to find another option.     The  now has a new GI clinic for people with Crohns and they have a  and  person so I am in the process of getting her scheduled with them as the fentanyl patch delivery system is not working for her.  She states that in the past oral narcotics were not absorbed through her gut wall.(?)   She is not going to wean off these narcotics easily and this is out of my league.  How can I arrange for her to be assessed in your clinic in a timely manner as the pharmacies are really pushing to cut back on her Fentanyl prescription.     Your advice with this would be greatly appreciated.       Ghada DODSON CNP

## 2017-06-06 ENCOUNTER — OFFICE VISIT (OUTPATIENT)
Dept: INTERNAL MEDICINE | Facility: CLINIC | Age: 42
End: 2017-06-06

## 2017-06-06 ENCOUNTER — TELEPHONE (OUTPATIENT)
Dept: INTERNAL MEDICINE | Facility: CLINIC | Age: 42
End: 2017-06-06

## 2017-06-06 VITALS
OXYGEN SATURATION: 99 % | BODY MASS INDEX: 18.91 KG/M2 | SYSTOLIC BLOOD PRESSURE: 120 MMHG | HEART RATE: 127 BPM | RESPIRATION RATE: 20 BRPM | DIASTOLIC BLOOD PRESSURE: 84 MMHG | WEIGHT: 110.2 LBS

## 2017-06-06 DIAGNOSIS — R10.84 ABDOMINAL PAIN, CHRONIC, GENERALIZED: ICD-10-CM

## 2017-06-06 DIAGNOSIS — G89.29 OTHER CHRONIC PAIN: ICD-10-CM

## 2017-06-06 DIAGNOSIS — G89.29 ABDOMINAL PAIN, CHRONIC, GENERALIZED: ICD-10-CM

## 2017-06-06 DIAGNOSIS — E55.9 VITAMIN D DEFICIENCY: ICD-10-CM

## 2017-06-06 DIAGNOSIS — K50.118 CROHN'S DISEASE OF LARGE INTESTINE WITH OTHER COMPLICATION (H): ICD-10-CM

## 2017-06-06 RX ORDER — FEXOFENADINE HCL 180 MG
1 TABLET ORAL 2 TIMES DAILY
Qty: 200 CAPSULE | Refills: 3 | Status: SHIPPED | OUTPATIENT
Start: 2017-06-06

## 2017-06-06 RX ORDER — FENTANYL 50 UG/1
PATCH TRANSDERMAL
Qty: 35 PATCH | Refills: 0 | Status: SHIPPED | OUTPATIENT
Start: 2017-06-10 | End: 2017-07-05

## 2017-06-06 RX ORDER — DIPHENOXYLATE HCL/ATROPINE 2.5-.025MG
2 TABLET ORAL 4 TIMES DAILY PRN
Qty: 300 TABLET | Refills: 11 | Status: SHIPPED | OUTPATIENT
Start: 2017-06-06

## 2017-06-06 RX ORDER — FENTANYL 25 UG/1
PATCH TRANSDERMAL
Qty: 35 PATCH | Refills: 0 | Status: SHIPPED | OUTPATIENT
Start: 2017-06-10 | End: 2017-07-06

## 2017-06-06 ASSESSMENT — PAIN SCALES - GENERAL: PAINLEVEL: NO PAIN (0)

## 2017-06-06 NOTE — LETTER
Tiffani Grubbs  [address] [place] [address]  : 1975  MRN:  [id number]      2017          Tiffani Grubbs is currently on a waiting list for an appointment with the Infl[facility] here at the [place] with the goal to have her meet with the doctor, the financial and  to start on an affordable biologic agent for her Crohns disease.  In the meantime she is scheduling an intake assessment for weaning from Fentanyl patches or changing to an analgesic with a better delivery system.     She is to use one patch every three days and may need to replace patches that fall off early if she cannot make them stay but may not use more than 1/24 hours.  She will bring in all used patches for accounting purposes at the time of picking up prescriptions. .    Any patches beyond prescribed dose will be dealt with on a case by case occurrence.       Ghada DODSON,CNP

## 2017-06-06 NOTE — NURSING NOTE
Chief Complaint   Patient presents with     Recheck Medication     discuss medications   Mery De La Fuente LPN 3:29 PM on 6/6/2017

## 2017-06-06 NOTE — MR AVS SNAPSHOT
After Visit Summary   6/6/2017    Tiffani Grubbs    MRN: [id number]           Patient Information     Date Of Birth          1975        Visit Information        Provider Department      6/6/2017 3:20 PM Ghada Scruggs APRN CNP [facility]        Today's Diagnoses     Other chronic pain        Abdominal pain, chronic, generalized        Crohn's disease of large intestine with other complication (H)        Vitamin D deficiency          Care Instructions    Primary Care Center Medication Refill Request Information:  * Please contact your pharmacy regarding ANY request for medication refills.  ** [facility] Prescription Fax = [phone]  * Please allow 3 business days for routine medication refills.  * Please allow 5 business days for controlled substance medication refills.     Primary Care Center Test Result notification information:  *You will be notified with in 7-10 days of your appointment day regarding the results of your test.  If you are on MyChart you will be notified as soon as the provider has reviewed the results and signed off on them.    Primary Care Center [phone]           Follow-ups after your visit        Your next 10 appointments already scheduled     Jul 03, 2017 10:30 AM CDT   (Arrive by 10:15 AM)   RETURN IRRITABLE BOWEL DISEASE with Carl Albright MD   [facility] Gastroenterology and IBD ([facility] [place])    [address] [place] [address] [phone]              Who to contact     Please call your clinic at [phone] to:    Ask questions about your health    Make or cancel appointments    Discuss your medicines    Learn about your test results    Speak to your doctor   If you have compliments or concerns about an experience at your clinic, or if you wish to file a complaint, please contact [facility] Patient Relations at [phone] or email us at  [email or web address]         Additional Information About Your Visit        [employer] Information     [employer] gives you secure access to your electronic health record. If you see a primary care provider, you can also send messages to your care team and make appointments. If you have questions, please call your primary care clinic.  If you do not have a primary care provider, please call [phone] and they will assist you.      [employer] is an electronic gateway that provides easy, online access to your medical records. With [employer], you can request a clinic appointment, read your test results, renew a prescription or communicate with your care team.     To access your existing account, please contact your [facility] or call [phone] for assistance.        Care EveryWhere ID     This is your Care EveryWhere ID. This could be used by other organizations to access your [facility] medical records  [id number]        Your Vitals Were     Pulse Respirations Pulse Oximetry BMI (Body Mass Index)          127 20 99% 18.91 kg/m2         Blood Pressure from Last 3 Encounters:   06/06/17 120/84   05/30/17 136/78   01/19/17 121/80    Weight from Last 3 Encounters:   06/06/17 50 kg (110 lb 3.2 oz)   05/30/17 50.3 kg (110 lb 14.4 oz)   01/19/17 51.3 kg (113 lb)              Today, you had the following     No orders found for display         Today's Medication Changes          These changes are accurate as of: 6/6/17 11:59 PM.  If you have any questions, ask your nurse or doctor.               Start taking these medicines.        Dose/Directions    * fentaNYL 25 mcg/hr 72 hr patch   Commonly known as:  DURAGESIC   Used for:  Other chronic pain, Abdominal pain, chronic, generalized   Started by:  Ghada Scruggs APRN CNP        1 patch every 24 hours.   Quantity:  35 patch   Refills:  0       * fentaNYL 50 mcg/hr 72 hr patch   Commonly known as:  DURAGESIC   Used for:  Abdominal  pain, chronic, generalized   Started by:  Ghada Scruggs APRN CNP        1 patch no more often than every 24 hours.   Quantity:  35 patch   Refills:  0       * Notice:  This list has 2 medication(s) that are the same as other medications prescribed for you. Read the directions carefully, and ask your doctor or other care provider to review them with you.         Where to get your medicines      These medications were sent to [id number] - [place], [place] - [address]  [address] [facility] [address]     Phone:  [phone]     [facility] 1000 UNITS Caps         Some of these will need a paper prescription and others can be bought over the counter.  Ask your nurse if you have questions.     Bring a paper prescription for each of these medications     diphenoxylate-atropine 2.5-0.025 MG per tablet    fentaNYL 25 mcg/hr 72 hr patch    fentaNYL 50 mcg/hr 72 hr patch                Primary Care Provider Office Phone # Fax #    IVORY Bradford [phone]       PRIMARY CARE CENTER [address] [place] [address] 7[address]  [facility] [address]        Thank you!     Thank you for choosing [facility]  for your care. Our goal is always to provide you with excellent care. Hearing back from our patients is one way we can continue to improve our services. Please take a few minutes to complete the written survey that you may receive in the mail after your visit with us. Thank you!             Your Updated Medication List - Protect others around you: Learn how to safely use, store and throw away your medicines at www.disposemymeds.org.          This list is accurate as of: 6/6/17 11:59 PM.  Always use your most recent med list.                   Brand Name Dispense Instructions for use    ALPRAZolam 0.5 MG tablet    XANAX    30 tablet    Take 1 tablet (0.5 mg) by mouth 3 times daily as needed for anxiety       [facility] D3 1000 UNITS Caps     200 capsule    Take 1 capsule by mouth 2 times  daily       diphenoxylate-atropine 2.5-0.025 MG per tablet    LOMOTIL    300 tablet    Take 2 tablets by mouth 4 times daily as needed       escitalopram 10 MG tablet    LEXAPRO    90 tablet    Take 1 tablet (10 mg) by mouth daily       * fentaNYL 25 mcg/hr 72 hr patch    DURAGESIC    35 patch    1 patch every 24 hours.       * fentaNYL 50 mcg/hr 72 hr patch    DURAGESIC    35 patch    1 patch no more often than every 24 hours.       prochlorperazine 5 MG tablet    COMPAZINE    60 tablet    Take 0.5 tablets (2.5 mg) by mouth every 6 hours as needed for nausea or vomiting       * Notice:  This list has 2 medication(s) that are the same as other medications prescribed for you. Read the directions carefully, and ask your doctor or other care provider to review them with you.

## 2017-06-06 NOTE — TELEPHONE ENCOUNTER
Letter written by Ghada Scruggs and faxed to pharmacy.  Nathaly Bonner RN        ----- Message from Jackson Gill sent at 6/6/2017 10:52 AM CDT -----  Regarding: Question about prescribing method   Contact: [phone]  [facility] from [facility]     Pt usually call them beforehand when she is expecting a refill, pt is requesting fentanyl patches. He states they have been filling them early for the last few months.     TERRY Scruggs says she would faxed a reasoning behind her prescribing methods so they can prescribe effectively but never recieved this fax from her.  Requesting to speak with a nurse or Ghada.

## 2017-06-06 NOTE — PATIENT INSTRUCTIONS
[facility] Medication Refill Request Information:  * Please contact your pharmacy regarding ANY request for medication refills.  ** [facility] Prescription Fax = [phone]  * Please allow 3 business days for routine medication refills.  * Please allow 5 business days for controlled substance medication refills.     [facility] Test Result notification information:  *You will be notified with in 7-10 days of your appointment day regarding the results of your test.  If you are on MyChart you will be notified as soon as the provider has reviewed the results and signed off on them.    [facility] [phone]

## 2017-06-08 DIAGNOSIS — G89.29 OTHER CHRONIC PAIN: ICD-10-CM

## 2017-06-08 RX ORDER — OXYCODONE AND ACETAMINOPHEN 5; 325 MG/1; MG/1
TABLET ORAL
Qty: 180 TABLET | Refills: 0 | Status: SHIPPED | OUTPATIENT
Start: 2017-06-26 | End: 2017-08-01

## 2017-06-10 NOTE — PROGRESS NOTES
HPI: Tiffani Grubbs is a 41 year old female who comes in for clarification of her plan  for switching to a different  medication.She and her daughter are leaving Saturday,June 10th to her father's ranch in [place] for 34 days.  She still has not heard from GI clinic about scheduling as a new pt in their clinic.  If she can afford a biologic agent she would like to come off narcotics completely.  She and realizes this might require a short hospital stay.  She will be home for 2 weeks in July and was then planning to go back there for several more weeks ut admits she might be able to leave her daughter there and come back alone for treatment.  She brings in with her patches she has used since we discussed on the phone that she should bring those in.   No flares in her bowel condition.    Patient Active Problem List   Diagnosis     Crohn's disease (H)     Abdominal pain, chronic, generalized     Anxiety     Depression     Medication refill- do not delete      Nicotine addiction     Thymus hyperplasia (H)     Pain management contract signed       She has a medical hx of  does not have any pertinent problems on file.    Current Outpatient Prescriptions   Medication Sig Dispense Refill     fentaNYL (DURAGESIC) 25 mcg/hr 72 hr patch 1 patch every 24 hours. 35 patch 0     fentaNYL (DURAGESIC) 50 mcg/hr 72 hr patch 1 patch no more often than every 24 hours. 35 patch 0     diphenoxylate-atropine (LOMOTIL) 2.5-0.025 MG per tablet Take 2 tablets by mouth 4 times daily as needed 300 tablet 11     Cholecalciferol ([employer]S D3) 1000 UNITS CAPS Take 1 capsule by mouth 2 times daily 200 capsule 3     ALPRAZolam (XANAX) 0.5 MG tablet Take 1 tablet (0.5 mg) by mouth 3 times daily as needed for anxiety 30 tablet 5     escitalopram (LEXAPRO) 10 MG tablet Take 1 tablet (10 mg) by mouth daily 90 tablet 3     prochlorperazine (COMPAZINE) 5 MG tablet Take 0.5 tablets (2.5 mg) by mouth every 6 hours as needed for nausea or vomiting 60 tablet  1     [START ON 2017] oxyCODONE-acetaminophen (PERCOCET) 5-325 MG per tablet Take 1-2 tab by mouth every 6 hours prn breakthrough pain 180 tablet 0         ALLERGIES: Review of patient's allergies indicates no known allergies.    PAST MEDICAL HX:   Past Medical History:   Diagnosis Date     Anxiety      Chronic pain      Crohn's      Depression      Salivary gland calculi        PAST SURGICAL HX:   Past Surgical History:   Procedure Laterality Date      SECTION  3-3-03     CHOLECYSTECTOMY         IMMUNIZATION HX:   Immunization History   Administered Date(s) Administered     Influenza (IIV3) 11/15/2012, 2016     Influenza (intradermal) 11/15/2015     MMR 1992     Pneumococcal 23 valent 2007     TD (ADULT, 7+) 2006     TDAP Vaccine (Boostrix) 2016     Tdap (Adacel,Boostrix) 10/22/2006       SOCIAL HX:   Social History     Social History Narrative    She is single with one daughter, age 11.          OBJECTIVE:  /84 (BP Location: Right arm, Patient Position: Chair, Cuff Size: Adult Regular)  Pulse 127  Resp 20  Wt 50 kg (110 lb 3.2 oz)  SpO2 99%  BMI 18.91 kg/m2   Wt Readings from Last 1 Encounters:   17 50 kg (110 lb 3.2 oz)     Constitutional: no distress, comfortable, pleasant   Eyes: anicteric   Neck: supple with full range of motion, no thyromegaly.   Cardiovascular: see VS   Respiratory:no distress.  Musculoskeletal: ambulatory, no edema   Skin: no concerning lesions, no jaundice, temp normal   Neurological: normal gait,normal speech, no tremor   Psychological: appropriate mood .    ASSESSMENT/PLAN:    ICD-10-CM    1. Other chronic pain G89.29 fentaNYL (DURAGESIC) 25 mcg/hr 72 hr patch   2. Abdominal pain, chronic, generalized R10.84 fentaNYL (DURAGESIC) 25 mcg/hr 72 hr patch    G89.29 fentaNYL (DURAGESIC) 50 mcg/hr 72 hr patch  She was given phone number to schedule appt with [facility] [employer] program to discuss getting off Fentanyl.    3. Crohn's  disease of large intestine with other complication (H) K50.118 diphenoxylate-atropine (LOMOTIL) 2.5-0.025 MG per tablet  I spoke with coordinator of [facility] and she is on a list to be scheduled.  She will also try scheduling with [place] [facility].    4. Vitamin D deficiency E55.9 Cholecalciferol (CVS D3) 1000 UNITS CAPS     She will continue to communicate with e via My Chart while in [place].    Total time spent 25 minutes.  More than 50% of the time spent with Ms. Grubbs on counseling / coordinating her care    Ghada DODSON, CNP

## 2017-06-16 ENCOUNTER — TELEPHONE (OUTPATIENT)
Dept: INTERNAL MEDICINE | Facility: CLINIC | Age: 42
End: 2017-06-16

## 2017-06-16 DIAGNOSIS — G89.29 ABDOMINAL PAIN, CHRONIC, GENERALIZED: ICD-10-CM

## 2017-06-16 DIAGNOSIS — R10.84 ABDOMINAL PAIN, CHRONIC, GENERALIZED: ICD-10-CM

## 2017-06-16 NOTE — TELEPHONE ENCOUNTER
Spoke with pharmacist and stated that pt was given only # 30 patches out of the 35 written.  Nathaly Bonner RN  ------------------------        ----- Message from Jackson Gill sent at 6/15/2017  2:29 PM CDT -----  Regarding: Rx Question   Contact: [phone]  [facility]    Wants to speak with a nurse in regards to the patient's Rx Fentanyl patches.    They didn't have the full 35 so the patient was only given 30 out of the 35 patches.     Re: fentaNYL (DURAGESIC) 50 mcg/hr 72 hr patch

## 2017-06-27 ENCOUNTER — TELEPHONE (OUTPATIENT)
Dept: GASTROENTEROLOGY | Facility: CLINIC | Age: 42
End: 2017-06-27

## 2017-06-29 RX ORDER — FENTANYL 50 UG/1
PATCH TRANSDERMAL
Qty: 5 PATCH | Refills: 0 | Status: CANCELLED | OUTPATIENT
Start: 2017-06-29

## 2017-06-30 ENCOUNTER — MYC MEDICAL ADVICE (OUTPATIENT)
Dept: INTERNAL MEDICINE | Facility: CLINIC | Age: 42
End: 2017-06-30

## 2017-06-30 DIAGNOSIS — G89.29 ABDOMINAL PAIN, CHRONIC, GENERALIZED: ICD-10-CM

## 2017-06-30 DIAGNOSIS — R10.84 ABDOMINAL PAIN, CHRONIC, GENERALIZED: ICD-10-CM

## 2017-07-01 ENCOUNTER — HEALTH MAINTENANCE LETTER (OUTPATIENT)
Age: 42
End: 2017-07-01

## 2017-07-06 DIAGNOSIS — R10.84 ABDOMINAL PAIN, CHRONIC, GENERALIZED: ICD-10-CM

## 2017-07-06 DIAGNOSIS — G89.29 ABDOMINAL PAIN, CHRONIC, GENERALIZED: ICD-10-CM

## 2017-07-06 DIAGNOSIS — G89.29 OTHER CHRONIC PAIN: ICD-10-CM

## 2017-07-06 RX ORDER — FENTANYL 25 UG/1
PATCH TRANSDERMAL
Qty: 25 PATCH | Refills: 0 | Status: SHIPPED | OUTPATIENT
Start: 2017-07-06 | End: 2017-08-01

## 2017-07-06 RX ORDER — FENTANYL 50 UG/1
PATCH TRANSDERMAL
Qty: 5 PATCH | Refills: 0 | Status: SHIPPED | OUTPATIENT
Start: 2017-07-06 | End: 2017-07-06

## 2017-07-06 RX ORDER — FENTANYL 50 UG/1
PATCH TRANSDERMAL
Qty: 30 PATCH | Refills: 0 | Status: SHIPPED | OUTPATIENT
Start: 2017-07-06 | End: 2017-08-01

## 2017-07-07 NOTE — TELEPHONE ENCOUNTER
Scripts placed in lockbox, with Percocet from 06/26/17. Patient is in [place], will have family or friend pick-up scripts at the lockbox.     Patient states that she is dissatisfied with how her prescriptions have been managed - no call back or lack of attention by staff regarding her prescription concerns. The prescriptions were handled within the clinic policy timeframe, but the patient disagrees with how this was handled.

## 2017-08-01 DIAGNOSIS — G89.29 ABDOMINAL PAIN, CHRONIC, GENERALIZED: ICD-10-CM

## 2017-08-01 DIAGNOSIS — R10.84 ABDOMINAL PAIN, CHRONIC, GENERALIZED: ICD-10-CM

## 2017-08-01 DIAGNOSIS — G89.29 OTHER CHRONIC PAIN: ICD-10-CM

## 2017-08-01 RX ORDER — FENTANYL 25 UG/1
PATCH TRANSDERMAL
Qty: 25 PATCH | Refills: 0 | Status: SHIPPED | OUTPATIENT
Start: 2017-08-05 | End: 2017-08-02

## 2017-08-01 RX ORDER — FENTANYL 50 UG/1
PATCH TRANSDERMAL
Qty: 30 PATCH | Refills: 0 | Status: SHIPPED | OUTPATIENT
Start: 2017-08-05 | End: 2017-08-29

## 2017-08-01 RX ORDER — OXYCODONE AND ACETAMINOPHEN 5; 325 MG/1; MG/1
TABLET ORAL
Qty: 180 TABLET | Refills: 0 | Status: SHIPPED | OUTPATIENT
Start: 2017-08-05 | End: 2017-08-29

## 2017-08-02 RX ORDER — FENTANYL 25 UG/1
PATCH TRANSDERMAL
Qty: 30 PATCH | Refills: 0 | Status: SHIPPED | OUTPATIENT
Start: 2017-08-05 | End: 2017-08-29

## 2017-08-21 DIAGNOSIS — F41.9 ANXIETY: ICD-10-CM

## 2017-08-21 NOTE — TELEPHONE ENCOUNTER
Date last refill was ordered:6/9/17 Per [place]  website   Quantity ordered:30  Number of refills:0 left  Date of last clinic visit:6/6/17   Date of next clinic visit:None     8/25/17 Rx faxed to [facility].  Xi Chandler RN

## 2017-08-25 RX ORDER — ALPRAZOLAM 0.5 MG
0.5 TABLET ORAL 3 TIMES DAILY PRN
Qty: 30 TABLET | Refills: 5 | Status: SHIPPED | OUTPATIENT
Start: 2017-08-25 | End: 2018-01-10

## 2017-08-29 DIAGNOSIS — G89.29 ABDOMINAL PAIN, CHRONIC, GENERALIZED: ICD-10-CM

## 2017-08-29 DIAGNOSIS — G89.29 OTHER CHRONIC PAIN: ICD-10-CM

## 2017-08-29 DIAGNOSIS — R10.84 ABDOMINAL PAIN, CHRONIC, GENERALIZED: ICD-10-CM

## 2017-08-29 RX ORDER — FENTANYL 50 UG/1
PATCH TRANSDERMAL
Qty: 30 PATCH | Refills: 0 | Status: SHIPPED | OUTPATIENT
Start: 2017-09-02 | End: 2017-09-28

## 2017-08-29 RX ORDER — FENTANYL 25 UG/1
PATCH TRANSDERMAL
Qty: 30 PATCH | Refills: 0 | Status: SHIPPED | OUTPATIENT
Start: 2017-09-02 | End: 2017-09-28

## 2017-08-29 RX ORDER — OXYCODONE AND ACETAMINOPHEN 5; 325 MG/1; MG/1
TABLET ORAL
Qty: 180 TABLET | Refills: 0 | Status: SHIPPED | OUTPATIENT
Start: 2017-09-02 | End: 2017-09-28

## 2017-09-28 DIAGNOSIS — G89.29 OTHER CHRONIC PAIN: ICD-10-CM

## 2017-09-28 DIAGNOSIS — G89.29 ABDOMINAL PAIN, CHRONIC, GENERALIZED: ICD-10-CM

## 2017-09-28 DIAGNOSIS — R10.84 ABDOMINAL PAIN, CHRONIC, GENERALIZED: ICD-10-CM

## 2017-09-28 RX ORDER — OXYCODONE AND ACETAMINOPHEN 5; 325 MG/1; MG/1
TABLET ORAL
Qty: 180 TABLET | Refills: 0 | Status: SHIPPED | OUTPATIENT
Start: 2017-09-28 | End: 2017-10-26

## 2017-09-28 RX ORDER — FENTANYL 50 UG/1
PATCH TRANSDERMAL
Qty: 30 PATCH | Refills: 0 | Status: SHIPPED | OUTPATIENT
Start: 2017-09-28 | End: 2017-11-07

## 2017-09-28 RX ORDER — FENTANYL 25 UG/1
PATCH TRANSDERMAL
Qty: 30 PATCH | Refills: 0 | Status: SHIPPED | OUTPATIENT
Start: 2017-09-28 | End: 2017-11-07

## 2017-10-26 DIAGNOSIS — G89.29 OTHER CHRONIC PAIN: ICD-10-CM

## 2017-10-26 RX ORDER — OXYCODONE AND ACETAMINOPHEN 5; 325 MG/1; MG/1
TABLET ORAL
Qty: 180 TABLET | Refills: 0 | Status: SHIPPED | OUTPATIENT
Start: 2017-10-26 | End: 2017-11-30

## 2017-11-07 DIAGNOSIS — G89.29 ABDOMINAL PAIN, CHRONIC, GENERALIZED: ICD-10-CM

## 2017-11-07 DIAGNOSIS — R10.84 ABDOMINAL PAIN, CHRONIC, GENERALIZED: ICD-10-CM

## 2017-11-07 DIAGNOSIS — G89.29 OTHER CHRONIC PAIN: ICD-10-CM

## 2017-11-09 DIAGNOSIS — Z53.9 ERRONEOUS ENCOUNTER--DISREGARD: Primary | ICD-10-CM

## 2017-11-09 RX ORDER — FENTANYL 25 UG/1
PATCH TRANSDERMAL
Qty: 30 PATCH | Refills: 0 | Status: SHIPPED | OUTPATIENT
Start: 2017-11-09 | End: 2017-12-28

## 2017-11-09 RX ORDER — FENTANYL 50 UG/1
PATCH TRANSDERMAL
Qty: 30 PATCH | Refills: 0 | Status: SHIPPED | OUTPATIENT
Start: 2017-11-09 | End: 2017-12-28

## 2017-11-22 ENCOUNTER — TELEPHONE (OUTPATIENT)
Dept: INTERNAL MEDICINE | Facility: CLINIC | Age: 42
End: 2017-11-22

## 2017-11-22 NOTE — TELEPHONE ENCOUNTER
----- Message from IVORY Bradford CNP sent at 11/9/2017 11:06 PM CST -----  Regarding: RE: Fentanyl refills.   I can continue for 2 months.  Not sure who cancelled the previous 2 appointments with GI.     Ghada DODSON CNP    ----- Message -----     From: Ray Galan RN     Sent: 11/9/2017   1:38 PM       To: IVORY Bradford CNP, #  Subject: RE: Fentanyl refills.                            Ghada,    I called her about your message. She was a bit upset about this. Because she was only given one person at the GI clinic to call to schedule her appointments. She waits days and sometimes weeks for a call back. She is currently waiting for a call back to schedule. She would like you to continue her medications because she doesn't feel our GI clinic is doing a great job - especially considering that she is only able to see Dr. Albright.     Let me know.    Hoang  ----- Message -----     From: Ghada Scruggs APRN CNP     Sent: 11/7/2017   9:49 PM       To: Nathaly Bonner RN  Subject: Fentanyl refills.                                Please call patient.  She cancelled 2 appointments with GI: 7/3/2017 and 10/31/2017. I will give her one month of medication but she will need to see GI before further refills will be given.  Please let her know.  Ghada DODSON CNP

## 2017-11-30 DIAGNOSIS — G89.29 OTHER CHRONIC PAIN: ICD-10-CM

## 2017-11-30 RX ORDER — OXYCODONE AND ACETAMINOPHEN 5; 325 MG/1; MG/1
TABLET ORAL
Qty: 180 TABLET | Refills: 0 | Status: SHIPPED | OUTPATIENT
Start: 2017-11-30 | End: 2017-12-28

## 2017-12-28 DIAGNOSIS — R10.84 ABDOMINAL PAIN, CHRONIC, GENERALIZED: ICD-10-CM

## 2017-12-28 DIAGNOSIS — G89.29 OTHER CHRONIC PAIN: ICD-10-CM

## 2017-12-28 DIAGNOSIS — G89.29 ABDOMINAL PAIN, CHRONIC, GENERALIZED: ICD-10-CM

## 2017-12-28 RX ORDER — OXYCODONE AND ACETAMINOPHEN 5; 325 MG/1; MG/1
TABLET ORAL
Qty: 180 TABLET | Refills: 0 | Status: SHIPPED | OUTPATIENT
Start: 2017-12-28

## 2017-12-28 RX ORDER — FENTANYL 25 UG/1
PATCH TRANSDERMAL
Qty: 30 PATCH | Refills: 0 | Status: SHIPPED | OUTPATIENT
Start: 2017-12-28

## 2017-12-28 RX ORDER — FENTANYL 50 UG/1
PATCH TRANSDERMAL
Qty: 30 PATCH | Refills: 0 | Status: SHIPPED | OUTPATIENT
Start: 2017-12-28

## 2017-12-29 NOTE — TELEPHONE ENCOUNTER
Rx taken to the drop box, message given to pt.  Reminded pt about the upcoming GI appt.  Nathaly Bonner RN

## 2018-01-05 ENCOUNTER — TELEPHONE (OUTPATIENT)
Dept: GASTROENTEROLOGY | Facility: CLINIC | Age: 43
End: 2018-01-05

## 2018-01-10 DIAGNOSIS — F41.9 ANXIETY: ICD-10-CM

## 2018-01-10 NOTE — TELEPHONE ENCOUNTER
ALPRAZolam (XANAX) 0.5 MG tablet      Last Written Prescription Date:  8-25-17  Last Fill Quantity: 30,   # refills: 5  Last Office Visit : 6-6-17  Future Office visit:  none    Routing refill request to provider for review/approval because:  Drug controlled substance.      Kathleen M Doege RN      ------------------  Rx faxed to pt's pharmacy.  Nathaly Bonner RN

## 2018-01-11 RX ORDER — ALPRAZOLAM 0.5 MG
0.5 TABLET ORAL 3 TIMES DAILY PRN
Qty: 30 TABLET | Refills: 0 | Status: SHIPPED | OUTPATIENT
Start: 2018-01-11

## 2021-05-26 ENCOUNTER — RECORDS - HEALTHEAST (OUTPATIENT)
Dept: ADMINISTRATIVE | Facility: CLINIC | Age: 46
End: 2021-05-26

## 2021-05-27 ENCOUNTER — RECORDS - HEALTHEAST (OUTPATIENT)
Dept: ADMINISTRATIVE | Facility: CLINIC | Age: 46
End: 2021-05-27

## 2021-05-30 ENCOUNTER — RECORDS - HEALTHEAST (OUTPATIENT)
Dept: ADMINISTRATIVE | Facility: CLINIC | Age: 46
End: 2021-05-30

## 2025-01-20 ENCOUNTER — HOSPITAL ENCOUNTER (OUTPATIENT)
Facility: CLINIC | Age: 50
Setting detail: OBSERVATION
Discharge: HOME | End: 2025-01-21
Attending: EMERGENCY MEDICINE | Admitting: EMERGENCY MEDICINE

## 2025-01-20 DIAGNOSIS — G47.00 INSOMNIA, UNSPECIFIED TYPE: Primary | ICD-10-CM

## 2025-01-20 DIAGNOSIS — R52 A VAGUE FEELING OF DISCOMFORT: ICD-10-CM

## 2025-01-20 DIAGNOSIS — F11.90 OPIOID USE DISORDER: ICD-10-CM

## 2025-01-20 PROBLEM — F11.93 OPIOID WITHDRAWAL (H): Status: ACTIVE | Noted: 2025-01-20

## 2025-01-20 PROCEDURE — 99223 1ST HOSP IP/OBS HIGH 75: CPT | Performed by: EMERGENCY MEDICINE

## 2025-01-20 PROCEDURE — 250N000011 HC RX IP 250 OP 636: Performed by: EMERGENCY MEDICINE

## 2025-01-20 PROCEDURE — 99285 EMERGENCY DEPT VISIT HI MDM: CPT | Mod: 25 | Performed by: EMERGENCY MEDICINE

## 2025-01-20 PROCEDURE — G2213 INITIAT MED ASSIST TX IN ER: HCPCS | Performed by: EMERGENCY MEDICINE

## 2025-01-20 PROCEDURE — 93005 ELECTROCARDIOGRAM TRACING: CPT

## 2025-01-20 PROCEDURE — 250N000013 HC RX MED GY IP 250 OP 250 PS 637: Performed by: EMERGENCY MEDICINE

## 2025-01-20 PROCEDURE — 96372 THER/PROPH/DIAG INJ SC/IM: CPT | Performed by: EMERGENCY MEDICINE

## 2025-01-20 RX ORDER — HYDROXYZINE HYDROCHLORIDE 50 MG/1
50 TABLET, FILM COATED ORAL ONCE
Status: COMPLETED | OUTPATIENT
Start: 2025-01-20 | End: 2025-01-20

## 2025-01-20 RX ORDER — LORAZEPAM 1 MG/1
1 TABLET ORAL ONCE
Status: COMPLETED | OUTPATIENT
Start: 2025-01-20 | End: 2025-01-20

## 2025-01-20 RX ORDER — GABAPENTIN 300 MG/1
600 CAPSULE ORAL ONCE
Status: COMPLETED | OUTPATIENT
Start: 2025-01-20 | End: 2025-01-20

## 2025-01-20 RX ORDER — GABAPENTIN 300 MG/1
300 CAPSULE ORAL ONCE
Status: COMPLETED | OUTPATIENT
Start: 2025-01-20 | End: 2025-01-20

## 2025-01-20 RX ORDER — CLONIDINE HYDROCHLORIDE 0.1 MG/1
0.1 TABLET ORAL ONCE
Status: COMPLETED | OUTPATIENT
Start: 2025-01-20 | End: 2025-01-20

## 2025-01-20 RX ORDER — ACETAMINOPHEN 500 MG
1000 TABLET ORAL ONCE
Status: COMPLETED | OUTPATIENT
Start: 2025-01-20 | End: 2025-01-20

## 2025-01-20 RX ORDER — KETOROLAC TROMETHAMINE 30 MG/ML
30 INJECTION, SOLUTION INTRAMUSCULAR; INTRAVENOUS ONCE
Status: COMPLETED | OUTPATIENT
Start: 2025-01-20 | End: 2025-01-20

## 2025-01-20 RX ORDER — METHOCARBAMOL 500 MG/1
500 TABLET, FILM COATED ORAL ONCE
Status: COMPLETED | OUTPATIENT
Start: 2025-01-20 | End: 2025-01-20

## 2025-01-20 RX ADMIN — CLONIDINE HYDROCHLORIDE 0.1 MG: 0.1 TABLET ORAL at 17:47

## 2025-01-20 RX ADMIN — HYDROXYZINE HYDROCHLORIDE 50 MG: 50 TABLET ORAL at 17:47

## 2025-01-20 RX ADMIN — KETOROLAC TROMETHAMINE 30 MG: 30 INJECTION, SOLUTION INTRAMUSCULAR at 19:40

## 2025-01-20 RX ADMIN — ACETAMINOPHEN 1000 MG: 500 TABLET, FILM COATED ORAL at 19:41

## 2025-01-20 RX ADMIN — METHOCARBAMOL 500 MG: 500 TABLET ORAL at 17:59

## 2025-01-20 RX ADMIN — LORAZEPAM 1 MG: 1 TABLET ORAL at 21:07

## 2025-01-20 RX ADMIN — METHOCARBAMOL 500 MG: 500 TABLET ORAL at 21:10

## 2025-01-20 RX ADMIN — GABAPENTIN 300 MG: 300 CAPSULE ORAL at 19:41

## 2025-01-20 RX ADMIN — CLONIDINE HYDROCHLORIDE 0.1 MG: 0.1 TABLET ORAL at 19:41

## 2025-01-20 RX ADMIN — GABAPENTIN 600 MG: 300 CAPSULE ORAL at 21:07

## 2025-01-20 ASSESSMENT — ACTIVITIES OF DAILY LIVING (ADL)
ADLS_ACUITY_SCORE: 41

## 2025-01-20 ASSESSMENT — COLUMBIA-SUICIDE SEVERITY RATING SCALE - C-SSRS
2. HAVE YOU ACTUALLY HAD ANY THOUGHTS OF KILLING YOURSELF IN THE PAST MONTH?: YES
3. HAVE YOU BEEN THINKING ABOUT HOW YOU MIGHT KILL YOURSELF?: NO
4. HAVE YOU HAD THESE THOUGHTS AND HAD SOME INTENTION OF ACTING ON THEM?: NO
1. IN THE PAST MONTH, HAVE YOU WISHED YOU WERE DEAD OR WISHED YOU COULD GO TO SLEEP AND NOT WAKE UP?: NO
6. HAVE YOU EVER DONE ANYTHING, STARTED TO DO ANYTHING, OR PREPARED TO DO ANYTHING TO END YOUR LIFE?: NO
5. HAVE YOU STARTED TO WORK OUT OR WORKED OUT THE DETAILS OF HOW TO KILL YOURSELF? DO YOU INTEND TO CARRY OUT THIS PLAN?: NO

## 2025-01-20 NOTE — ED PROVIDER NOTES
ED Provider Note  [facility]      History     Chief Complaint   Patient presents with    Addiction Problem     Seeking detox off of heroin, last use on Saturday. Shoots and injects heroin.     HPI  Tiffani Grubbs is a 49 year old female who presents to the emergency department with her partner seeking detox off of heroin.  Her partner reports she last used 3 days ago and on average smokes 1 g of heroin a day.  She has been supplementing with 5 g kratom daily since stopping heroin and last used kratom around 6-8 AM this morning.  She rates her discomfort an 8 out of 10.  She has not been able to sleep much in the past 3 days.  Patient's partner reports that she is interested in Brixadi. He also notes she has also tried Suboxone by itself in the past which was a bad experience for her.        Past Medical History  Past Medical History:   Diagnosis Date    Anxiety     Chronic pain     Crohn's     Depression     Salivary gland calculi      Past Surgical History:   Procedure Laterality Date     SECTION  3-3-03    CHOLECYSTECTOMY       ALPRAZolam (XANAX) 0.5 MG tablet  Cholecalciferol (CVS D3) 1000 UNITS CAPS  diphenoxylate-atropine (LOMOTIL) 2.5-0.025 MG per tablet  escitalopram (LEXAPRO) 10 MG tablet  fentaNYL (DURAGESIC) 25 mcg/hr 72 hr patch  fentaNYL (DURAGESIC) 50 mcg/hr 72 hr patch  oxyCODONE-acetaminophen (PERCOCET) 5-325 MG per tablet  prochlorperazine (COMPAZINE) 5 MG tablet      No Known Allergies  Family History  Family History   Problem Relation Age of Onset    C.A.D. Unknown     Cancer Unknown         melanoma     Social History   Social History     Tobacco Use    Smoking status: Former     Current packs/day: 0.00     Types: Cigarettes     Quit date: 2/15/2016     Years since quittin.9    Smokeless tobacco: Never    Tobacco comments:     tryiing to quit   Substance Use Topics    Alcohol use: No     Alcohol/week: 0.0 standard drinks of alcohol    Drug use: Yes      Comment: heroin      A medically appropriate review of systems was performed with pertinent positives and negatives noted in the HPI, and all other systems negative.    Physical Exam   BP: 113/75  Pulse: 84  Temp: 98.2  F (36.8  C)  Resp: 17  Weight: 56.7 kg (125 lb)  SpO2: 100 %  Physical Exam  Vitals and nursing note reviewed.   Constitutional:       General: She is not in acute distress.     Appearance: Normal appearance. She is well-developed.   HENT:      Head: Normocephalic and atraumatic.   Eyes:      General: No scleral icterus.     Conjunctiva/sclera: Conjunctivae normal.   Cardiovascular:      Rate and Rhythm: Normal rate.   Pulmonary:      Effort: Pulmonary effort is normal. No respiratory distress.   Abdominal:      General: Abdomen is flat.   Musculoskeletal:      Cervical back: Normal range of motion and neck supple.   Skin:     General: Skin is warm and dry.      Findings: No rash.   Neurological:      Mental Status: She is alert and oriented to person, place, and time.           ED Course, Procedures, & Data      Procedures       -----  Initiation of Medication for the Treatment of Opioid Use Disorder (OUD) in the Emergency Department (ED)  HCPCS code      Assessment:   Opioid(s) used: fentanyl   Amount: 1 to 3 g  Frequency: Daily  Route: snorted  Duration: 6 years  Last use: 3 days ago    Other substance(s) with ongoing use: Kratom    The patient meets the following DSM-V criteria for Opioid Use Disorder (OUD):   Taking more than was intended  Craving  Reduced social, occupational, or recreational activities  Recurrent use in physically hazardous situations  Continued use despite a problem caused or worsened by opioids  Tolerance  Withdrawal    (Severity: Mild: 2-3 criteria, Moderate: 4-5 criteria. Severe: 6 or more criteria)  Based on my assessment, the patient has Severe OUD.     Medication Initiated in the ED:  In the ED, treatment for OUD was initiated. The patient was given 1 dose(s)  of 32 mg subcutaneous buprenorphine while in the ED.     Upon ED discharge, outpatient prescription treatment for OUD was initiated.   The patient was prescribed Suboxone and naloxone.      Referral to Ongoing Care and Supportive Services:   Upon ED discharge, the patient was referred to Addiction Medicine for ongoing care and supportive services. The patient was also provided with information on community resources for various supportive services for OUD, and advised to return to the ED if having worsening symptoms.   -----       No results found for any visits on 01/20/25.  Medications - No data to display  Labs Ordered and Resulted from Time of ED Arrival to Time of ED Departure - No data to display  No orders to display          Critical care was not performed.     Medical Decision Making  The patient's presentation was of high complexity (a chronic illness severe exacerbation, progression, or side effect of treatment).    The patient's evaluation involved:  ordering and/or review of 3+ test(s) in this encounter (see separate area of note for details)    The patient's management necessitated high risk (a parenteral controlled substance).    Assessment & Plan      49 year old female who presents to the emergency department with her partner seeking detox off of heroin.  Vital signs stable afebrile including normal pulse ox 100% on room air.  Patient given comfort meds including hydroxyzine and clonidine followed by Brixadi 32 mg subcu and additional comfort meds including Robaxin and Toradol and gabapentin furthermore clonidine and Ativan.  IV inserted and labs sent which revealed normal CBC electrolytes, reassuring phonatory markers and normal lactic acid.  She was given a liter normal saline fluid bolus upon repeat assessment she was quite sedated and somnolent but arousable.  Placed on observation status with plan to discharge in the morning around 845a with 30 tabs of Subutex and plan to follow-up in recovery  clinic immediately after leaving the emergency department.  Patient significant other Ray at the bedside informed and agreeable with plan.      I have reviewed the nursing notes. I have reviewed the findings, diagnosis, plan and need for follow up with the patient.    New Prescriptions    No medications on file       Final diagnoses:   Opioid use disorder   I, Shanita Swartz, am serving as a trained medical scribe to document services personally performed by Donya Nolasco MD, based on the provider's statements to me.     I, Donya Nolasco MD, was physically present and have reviewed and verified the accuracy of this note documented by Shanita Swartz.     Donya Nolasco MD  [facility] EMERGENCY DEPARTMENT  1/20/2025     Donya Nolasco MD  01/21/25 0351

## 2025-01-20 NOTE — ED TRIAGE NOTES
Seeking detox off of heroin, last use on Saturday. Shoots and injects heroin.     Triage Assessment (Adult)       Row Name 01/20/25 5181          Triage Assessment    Airway WDL WDL        Respiratory WDL    Respiratory WDL WDL        Skin Circulation/Temperature WDL    Skin Circulation/Temperature WDL WDL        Cardiac WDL    Cardiac WDL WDL        Peripheral/Neurovascular WDL    Peripheral Neurovascular WDL WDL        Cognitive/Neuro/Behavioral WDL    Cognitive/Neuro/Behavioral WDL WDL

## 2025-01-21 VITALS
BODY MASS INDEX: 21.45 KG/M2 | SYSTOLIC BLOOD PRESSURE: 90 MMHG | OXYGEN SATURATION: 98 % | HEART RATE: 65 BPM | TEMPERATURE: 98.3 F | WEIGHT: 125 LBS | DIASTOLIC BLOOD PRESSURE: 54 MMHG | RESPIRATION RATE: 16 BRPM

## 2025-01-21 LAB
ALBUMIN SERPL BCG-MCNC: 3.5 G/DL (ref 3.5–5.2)
ALP SERPL-CCNC: 122 U/L (ref 40–150)
ALT SERPL W P-5'-P-CCNC: 125 U/L (ref 0–50)
ANION GAP SERPL CALCULATED.3IONS-SCNC: 7 MMOL/L (ref 7–15)
AST SERPL W P-5'-P-CCNC: 41 U/L (ref 0–45)
ATRIAL RATE - MUSE: 83 BPM
BASOPHILS # BLD AUTO: 0 10E3/UL (ref 0–0.2)
BASOPHILS NFR BLD AUTO: 0 %
BILIRUB SERPL-MCNC: 0.2 MG/DL
BUN SERPL-MCNC: 14.5 MG/DL (ref 6–20)
CALCIUM SERPL-MCNC: 8.8 MG/DL (ref 8.8–10.4)
CHLORIDE SERPL-SCNC: 107 MMOL/L (ref 98–107)
CREAT SERPL-MCNC: 0.64 MG/DL (ref 0.51–0.95)
CRP SERPL-MCNC: 22.03 MG/L
DIASTOLIC BLOOD PRESSURE - MUSE: NORMAL MMHG
EGFRCR SERPLBLD CKD-EPI 2021: >90 ML/MIN/1.73M2
EOSINOPHIL # BLD AUTO: 0.1 10E3/UL (ref 0–0.7)
EOSINOPHIL NFR BLD AUTO: 1 %
ERYTHROCYTE [DISTWIDTH] IN BLOOD BY AUTOMATED COUNT: 13.1 % (ref 10–15)
ERYTHROCYTE [SEDIMENTATION RATE] IN BLOOD BY WESTERGREN METHOD: 21 MM/HR (ref 0–20)
GLUCOSE SERPL-MCNC: 94 MG/DL (ref 70–99)
HCO3 SERPL-SCNC: 28 MMOL/L (ref 22–29)
HCT VFR BLD AUTO: 36.1 % (ref 35–47)
HGB BLD-MCNC: 12.1 G/DL (ref 11.7–15.7)
IMM GRANULOCYTES # BLD: 0.1 10E3/UL
IMM GRANULOCYTES NFR BLD: 1 %
INTERPRETATION ECG - MUSE: NORMAL
LACTATE SERPL-SCNC: 0.6 MMOL/L (ref 0.7–2)
LYMPHOCYTES # BLD AUTO: 2.7 10E3/UL (ref 0.8–5.3)
LYMPHOCYTES NFR BLD AUTO: 25 %
MCH RBC QN AUTO: 28 PG (ref 26.5–33)
MCHC RBC AUTO-ENTMCNC: 33.5 G/DL (ref 31.5–36.5)
MCV RBC AUTO: 84 FL (ref 78–100)
MONOCYTES # BLD AUTO: 0.8 10E3/UL (ref 0–1.3)
MONOCYTES NFR BLD AUTO: 7 %
NEUTROPHILS # BLD AUTO: 7.3 10E3/UL (ref 1.6–8.3)
NEUTROPHILS NFR BLD AUTO: 67 %
NRBC # BLD AUTO: 0 10E3/UL
NRBC BLD AUTO-RTO: 0 /100
P AXIS - MUSE: 75 DEGREES
PLATELET # BLD AUTO: 191 10E3/UL (ref 150–450)
POTASSIUM SERPL-SCNC: 3.9 MMOL/L (ref 3.4–5.3)
PR INTERVAL - MUSE: 144 MS
PROT SERPL-MCNC: 6.7 G/DL (ref 6.4–8.3)
QRS DURATION - MUSE: 90 MS
QT - MUSE: 358 MS
QTC - MUSE: 420 MS
R AXIS - MUSE: 68 DEGREES
RBC # BLD AUTO: 4.32 10E6/UL (ref 3.8–5.2)
SODIUM SERPL-SCNC: 142 MMOL/L (ref 135–145)
SYSTOLIC BLOOD PRESSURE - MUSE: NORMAL MMHG
T AXIS - MUSE: 69 DEGREES
VENTRICULAR RATE- MUSE: 83 BPM
WBC # BLD AUTO: 10.9 10E3/UL (ref 4–11)

## 2025-01-21 PROCEDURE — 96360 HYDRATION IV INFUSION INIT: CPT

## 2025-01-21 PROCEDURE — 83605 ASSAY OF LACTIC ACID: CPT | Performed by: EMERGENCY MEDICINE

## 2025-01-21 PROCEDURE — 96360 HYDRATION IV INFUSION INIT: CPT | Performed by: EMERGENCY MEDICINE

## 2025-01-21 PROCEDURE — 82947 ASSAY GLUCOSE BLOOD QUANT: CPT | Performed by: EMERGENCY MEDICINE

## 2025-01-21 PROCEDURE — 258N000003 HC RX IP 258 OP 636: Performed by: EMERGENCY MEDICINE

## 2025-01-21 PROCEDURE — 85025 COMPLETE CBC W/AUTO DIFF WBC: CPT | Performed by: EMERGENCY MEDICINE

## 2025-01-21 PROCEDURE — 86140 C-REACTIVE PROTEIN: CPT | Performed by: EMERGENCY MEDICINE

## 2025-01-21 PROCEDURE — 250N000013 HC RX MED GY IP 250 OP 250 PS 637: Performed by: EMERGENCY MEDICINE

## 2025-01-21 PROCEDURE — G0378 HOSPITAL OBSERVATION PER HR: HCPCS

## 2025-01-21 PROCEDURE — 96361 HYDRATE IV INFUSION ADD-ON: CPT | Performed by: EMERGENCY MEDICINE

## 2025-01-21 PROCEDURE — 36415 COLL VENOUS BLD VENIPUNCTURE: CPT | Performed by: EMERGENCY MEDICINE

## 2025-01-21 PROCEDURE — 85652 RBC SED RATE AUTOMATED: CPT | Performed by: EMERGENCY MEDICINE

## 2025-01-21 PROCEDURE — 99238 HOSP IP/OBS DSCHRG MGMT 30/<: CPT | Performed by: EMERGENCY MEDICINE

## 2025-01-21 PROCEDURE — 96361 HYDRATE IV INFUSION ADD-ON: CPT

## 2025-01-21 RX ORDER — BUPRENORPHINE 8 MG/1
8 TABLET SUBLINGUAL DAILY
Qty: 30 TABLET | Refills: 0 | Status: SHIPPED | OUTPATIENT
Start: 2025-01-21 | End: 2025-03-09

## 2025-01-21 RX ORDER — SODIUM CHLORIDE 9 MG/ML
INJECTION, SOLUTION INTRAVENOUS
Status: COMPLETED
Start: 2025-01-21 | End: 2025-01-21

## 2025-01-21 RX ORDER — CYCLOBENZAPRINE HCL 10 MG
10 TABLET ORAL ONCE
Status: COMPLETED | OUTPATIENT
Start: 2025-01-21 | End: 2025-01-21

## 2025-01-21 RX ADMIN — CYCLOBENZAPRINE 10 MG: 10 TABLET, FILM COATED ORAL at 07:40

## 2025-01-21 RX ADMIN — SODIUM CHLORIDE 1000 ML: 9 INJECTION, SOLUTION INTRAVENOUS at 00:18

## 2025-01-21 RX ADMIN — SODIUM CHLORIDE 1000 ML: 9 INJECTION, SOLUTION INTRAVENOUS at 04:19

## 2025-01-21 ASSESSMENT — ACTIVITIES OF DAILY LIVING (ADL)
ADLS_ACUITY_SCORE: 41

## 2025-01-21 NOTE — ED NOTES
When this RN attempted to assess patient, pt presented disoriented and difficult to arouse. BPs are soft and trending down with SBP in the 90s and DBP in the 60s. MD notified. Bolus of normal saline infusing. All other VS stable on RA, pt is resting with eyes closed, even chest rise and fall; unlabored breathing.    VIEW ALL

## 2025-01-21 NOTE — DISCHARGE INSTRUCTIONS
Follow-up in recovery clinic during walk-in hours between 9 AM 3 PM, ideally immediately after discharge from the emergency department this morning.  They will arrange for you to set your insurance status and advise you on how to get set up with the long-acting 30-day form of buprenorphine which is known as Sublocade.  In the meantime, you may need to take an extra 1 or more tablets of Subutex in addition to the 7-day lasting Brixadi injection that you received in the emergency department today.    Recovery Clinic  [address] [place] [address] (Entrance next to Emergency Department)   Walk-in Hours: Monday to Friday 9am to 11:30 and 12:30 to 3pm   Phone: [phone]    Brixadi Discharge Instructions  Today, you received an injection of Brixadi (buprenorphine) to treat your opioid addiction. This medication has been injected under your skin and will be released into your body slowly over the next week to help reduce cravings and prevent withdrawal. It will be important for you to share this information with other healthcare providers, so they know how best to care for you while you are being treated with buprenorphine extended-release (BUP-XR).     After the medication is injected, you may feel a small bump under the skin for several days to weeks, it will get smaller over time. Do NOT rub or massage the area; keep it clean and dry. You will want to watch the injection site for redness, swelling, worsening pain, and/or drainage. To help soothe any discomfort you may experiencing you can place an ice pack at the site for up to 15 minutes at a time.      Buprenorphine is a safe medication for the treatment of opioid use disorder   Common Side Effects Seek Medical Attention Avoid   Constipation  Dizziness  Drowsiness   Headache  Injection site itching  Injection site pain   Nausea  Vomiting  Severe allergic reaction (rash, itching, swelling, severe dizziness, trouble breathing)   Severe drowsiness or difficulty waking up    Confusion or severe mood changes  Difficulty breathing or shortness of breath  Infection at the injection site (redness, swelling, worsening pain, pus) Alcohol, may increase serious side effects  Opioids, you will not feel the same effect as usual  Sedatives, or medications not prescribed to you as these can interact with BUP-XR     What other medications will I be discharged with?  You may be discharged with a prescription for additional prescriptions including   Buprenorphine - to help treat withdrawal and craving (if they return)  Ondansetron (Zofran) - to help treat nausea & vomiting   Acetaminophen (Tylenol) - may help muscle aches & pains   Ibuprofen (Motrin) - may help muscle aches & pains   Hydroxyzine (Atarax) - may help lessen anxiety   Clonidine - to help treat anxiety & restlessness, lower blood pressure & heart rate  What should I watch out for?  Once you leave the Emergency Department (ED), you will want to watch out for worsening withdrawal symptoms.   Anxiety and/or extreme restlessness  Muscle aches and body pains  Intense cravings for opioids  Rapid or racing heart rate  High blood pressure  Sweating   Shaking or tremors  Nausea, vomiting, diarrhea   What if the withdrawal comes back?  If any of the above withdrawal symptoms return, take buprenorphine   Dose one or two 8mg tablets or strips UNDER your tongue (total dose 8-16mg)  Wait one hour   If you feel the same or worse repeat dose (8mg - 16mg) - give yourself time and allow the medication to work  The next day, take half of the total dose in the morning and at night   If you took a total of 16mg, then take 8mg in the morning and 8mg at night  If you took a total of 32mg, then take 16mg in the morning and 16mg at night   Return to the Emergency Room if your symptoms do not improve       [facility]  [address] [place] [address]  [phone] *Press 1  [phone] (On-Call Access After Hours)  Peer  Recovery Support: [phone]  Hours: Monday - Friday 8:30am to 5pm (Tues open at 9:30am; Lobby is closed 12:45 to 1:45 daily)    Remember: Follow your treatment plan and attend all follow-up appointments. If you have any questions or concerns, do not hesitate to reach out to your healthcare provider. Your health and recovery are important.

## 2025-01-22 NOTE — ED PROVIDER NOTES
ED Observation Discharge Summary  [facility]  Discharge Date: 1/22/2025    Tiffani Grubbs MRN: [id number]   Age: 49 year old YOB: 1975     Interval History   Patient observed overnight until able to go to crisis center in the morning. No acute events overnight or my shift    Physical Exam   BP 90/54   Pulse 65   Temp 98.3  F (36.8  C) (Oral)   Resp 16   Wt 56.7 kg (125 lb)   SpO2 98%   BMI 21.45 kg/m    Physical Exam  Physical Exam   Constitutional: oriented to person, place, and time. appears well-developed and well-nourished.   HENT:   Head: Normocephalic and atraumatic.   Neck: Normal range of motion.   Pulmonary/Chest: Effort normal. No respiratory distress.   Cardiac: No murmurs, rubs, gallops. RRR.  Abdominal: Abdomen soft, nontender, nondistended. No rebound tenderness.  MSK: Long bones without deformity or evidence of trauma  Neurological: alert and oriented to person, place, and time.   Skin: Skin is warm and dry.   Psychiatric:  normal mood and affect.  behavior is normal. Thought content normal.         Observation Course   Patient admitted to the ED Observation Unit with opioid dependence.     Services consulted during the observation course: DEC. Notable consultant recommendations include: Observation overnight, discharge to crisis/treatment         Through the patient's time in observation care, all diagnostic and therapeutic goals were met. See earlier notes for specific goals. Nursing notes reviewed. After counseling on the diagnosis, work-up, treatment plan, and importance of follow-up, the patient was discharged. Patient to follow-up with PCP if any concerns. Patient to return to the ED if any urgent or potentially life-threatening concerns.     Discharge Diagnoses:   Final diagnoses:   Opioid use disorder       Discharge Medication List as of 1/21/2025  7:37 AM        START taking these medications    Details   buprenorphine (SUBUTEX) 8 MG SUBL  sublingual tablet Place 1 tablet (8 mg) under the tongue daily., Disp-30 tablet, R-0, [facility]      naloxone (NARCAN) 4 MG/0.1ML nasal spray Spray 1 spray (4 mg) into one nostril alternating nostrils as needed for opioid reversal. every 2-3 minutes until assistance arrives, Disp-2 each, R-0, [facility]           CONTINUE these medications which have NOT CHANGED    Details   ALPRAZolam (XANAX) 0.5 MG tablet Take 1 tablet (0.5 mg) by mouth 3 times daily as needed for anxiety No further refill, need to see primary for further refill, Disp-30 tablet, R-0, [facility]      Cholecalciferol ([employer] D3) 1000 UNITS CAPS Take 1 capsule by mouth 2 times daily, Disp-200 capsule, R-3, E-Prescribe      diphenoxylate-atropine (LOMOTIL) 2.5-0.025 MG per tablet Take 2 tablets by mouth 4 times daily as needed, Disp-300 tablet, R-11, [facility]      escitalopram (LEXAPRO) 10 MG tablet Take 1 tablet (10 mg) by mouth daily, Disp-90 tablet, R-3, E-Prescribe      fentaNYL (DURAGESIC) 25 mcg/hr 72 hr patch 1 patch every 24 hours., Disp-30 patch, R-0, [facility]      fentaNYL (DURAGESIC) 50 mcg/hr 72 hr patch 1 patch no more often than every 24 hours., Disp-30 patch, R-0, Local [facility]      oxyCODONE-acetaminophen (PERCOCET) 5-325 MG per tablet Take 1-2 tab by mouth every 6 hours prn breakthrough pain, Disp-180 tablet, R-0, [facility]      prochlorperazine (COMPAZINE) 5 MG tablet Take 0.5 tablets (2.5 mg) by mouth every 6 hours as needed for nausea or vomiting, Disp-60 tablet, R-1, E-Prescribe              --  Ankur Drummond MD  [facility] EMERGENCY DEPARTMENT  1/22/2025      Ankur Drummond MD  01/22/25 0843

## 2025-01-28 ENCOUNTER — HOSPITAL ENCOUNTER (EMERGENCY)
Facility: CLINIC | Age: 50
Discharge: HOME | End: 2025-01-28
Attending: EMERGENCY MEDICINE | Admitting: EMERGENCY MEDICINE

## 2025-01-28 VITALS
BODY MASS INDEX: 19.67 KG/M2 | HEIGHT: 66 IN | DIASTOLIC BLOOD PRESSURE: 74 MMHG | TEMPERATURE: 98.1 F | HEART RATE: 74 BPM | SYSTOLIC BLOOD PRESSURE: 103 MMHG | WEIGHT: 122.4 LBS | RESPIRATION RATE: 18 BRPM | OXYGEN SATURATION: 100 %

## 2025-01-28 DIAGNOSIS — F11.90 OPIOID USE DISORDER: ICD-10-CM

## 2025-01-28 PROCEDURE — 250N000011 HC RX IP 250 OP 636: Mod: JZ | Performed by: EMERGENCY MEDICINE

## 2025-01-28 PROCEDURE — G2213 INITIAT MED ASSIST TX IN ER: HCPCS | Performed by: EMERGENCY MEDICINE

## 2025-01-28 PROCEDURE — 99284 EMERGENCY DEPT VISIT MOD MDM: CPT | Performed by: EMERGENCY MEDICINE

## 2025-01-28 PROCEDURE — 96372 THER/PROPH/DIAG INJ SC/IM: CPT | Performed by: EMERGENCY MEDICINE

## 2025-01-28 RX ORDER — ONDANSETRON 4 MG/1
4 TABLET, ORALLY DISINTEGRATING ORAL EVERY 8 HOURS PRN
Qty: 10 TABLET | Refills: 0 | Status: SHIPPED | OUTPATIENT
Start: 2025-01-28

## 2025-01-28 RX ORDER — HYDROXYZINE HYDROCHLORIDE 25 MG/1
25 TABLET, FILM COATED ORAL 3 TIMES DAILY PRN
Qty: 30 TABLET | Refills: 0 | Status: SHIPPED | OUTPATIENT
Start: 2025-01-28

## 2025-01-28 RX ORDER — HYDROXYZINE HYDROCHLORIDE 25 MG/1
25-50 TABLET, FILM COATED ORAL 3 TIMES DAILY PRN
Qty: 30 TABLET | Refills: 0 | Status: SHIPPED | OUTPATIENT
Start: 2025-01-28

## 2025-01-28 RX ORDER — BUPRENORPHINE AND NALOXONE 8; 2 MG/1; MG/1
1 FILM, SOLUBLE BUCCAL; SUBLINGUAL DAILY
Qty: 30 FILM | Refills: 0 | Status: SHIPPED | OUTPATIENT
Start: 2025-01-28

## 2025-01-28 RX ORDER — ONDANSETRON 8 MG/1
8 TABLET, ORALLY DISINTEGRATING ORAL EVERY 8 HOURS PRN
Qty: 10 TABLET | Refills: 0 | Status: SHIPPED | OUTPATIENT
Start: 2025-01-28 | End: 2025-01-31

## 2025-01-28 RX ADMIN — BUPRENORPHINE 32 MG: 32 INJECTION SUBCUTANEOUS at 03:02

## 2025-01-28 ASSESSMENT — COLUMBIA-SUICIDE SEVERITY RATING SCALE - C-SSRS
2. HAVE YOU ACTUALLY HAD ANY THOUGHTS OF KILLING YOURSELF IN THE PAST MONTH?: NO
1. IN THE PAST MONTH, HAVE YOU WISHED YOU WERE DEAD OR WISHED YOU COULD GO TO SLEEP AND NOT WAKE UP?: NO
6. HAVE YOU EVER DONE ANYTHING, STARTED TO DO ANYTHING, OR PREPARED TO DO ANYTHING TO END YOUR LIFE?: NO

## 2025-01-28 ASSESSMENT — ACTIVITIES OF DAILY LIVING (ADL)
ADLS_ACUITY_SCORE: 41
ADLS_ACUITY_SCORE: 41

## 2025-01-28 NOTE — ED NOTES
First Step  Program - Initial SUN Consult Note:    Demographics:  Patient name Tiffani Grubbs   /Age 1975, 49 year old   Gender/Ethnicity female   The patient's reported race is:     or   White   Chief Complaint Addiction Problem     Language of Care English    No     Writer was unit's assigned Substance Use Navigator (SUN) for the shift and was notified by Triage RN at  03:50 AM that Tiffani Grubbs presented seeking Mx for addiction treatment in the context of chronic opioid use. Pt approached in [facility] entrance near [facility] for SUN consult. Pt endorses using Heroin 1 g per day. Most recent use identified as approximately  08:00 AM on 2025. Current withdrawal symptoms indicated to be moderate and noteworthy for the following, per Pt: Nausea/vomiting, Body aches, and Agitation     ED provider and primary RN notified of SUN consult and made aware of Pt's current condition/symptoms.    Other information:    Pt does not have a history of utilizing medication-assisted treatment (MAT) for their opioid use.    Pt wants to pursue treatment options following ED stay: no   Pt presents from, or with plan to attend, treatment facility: no    Pt contact for follow-up: [phone]

## 2025-01-28 NOTE — ED PROVIDER NOTES
ED Provider Note  [facility]      History     Chief Complaint   Patient presents with    Addiction Problem     Wanting shot for substance addiction     HPI  Tiffani Grubbs is a 49 year old female with a medical history of opioid use disorder, amongst others, presents to the ED requesting back study injection.  She did have a Brixadi injection on 2025.  She did not follow at the recovery clinic, because it was her understanding she needed insurance to go there.  She went to [facility] instead.  She states that they helped her fill out her insurance paperwork, she believes it is going to be in place within a week of filling it out.  She was given Sublocade here, states she did not understand she should take it under her tongue, so we will swallowing it.  She went to the clinic and was given Suboxone dissolvable films.  She is tried that a few times, though states that she is having a lot of nausea, and just is not tolerating them.  She states that once her insurance is in place they can start giving her the long-acting injection, and she is hoping that will be in place within the next week.  She states that she has ongoing nausea, but does not want any nausea medications.  No vomiting.  No abdominal pain, fever, cough, shortness of breath.  She has some anxiety but no suicidal ideation.  She has not used in the last week.  She states she had a lot of cravings at first, but they are actually much decreased now.  She still thinks she is having some withdrawal, but much better than before.    Past Medical History  Past Medical History:   Diagnosis Date    Anxiety     Chronic pain     Crohn's     Depression     Salivary gland calculi      Past Surgical History:   Procedure Laterality Date     SECTION  3-3-03    CHOLECYSTECTOMY       buprenorphine (SUBUTEX) 8 MG SUBL sublingual tablet  buprenorphine HCl-naloxone HCl (SUBOXONE) 8-2 MG per film  hydrOXYzine HCl (ATARAX) 25 MG  "tablet  ondansetron (ZOFRAN ODT) 8 MG ODT tab  ALPRAZolam (XANAX) 0.5 MG tablet  Cholecalciferol ([employer]S D3) 1000 UNITS CAPS  diphenoxylate-atropine (LOMOTIL) 2.5-0.025 MG per tablet  escitalopram (LEXAPRO) 10 MG tablet  fentaNYL (DURAGESIC) 25 mcg/hr 72 hr patch  fentaNYL (DURAGESIC) 50 mcg/hr 72 hr patch  naloxone (NARCAN) 4 MG/0.1ML nasal spray  oxyCODONE-acetaminophen (PERCOCET) 5-325 MG per tablet  prochlorperazine (COMPAZINE) 5 MG tablet      No Known Allergies  Family History  Family History   Problem Relation Age of Onset    C.A.D. Unknown     Cancer Unknown         melanoma     Social History   Social History     Tobacco Use    Smoking status: Every Day     Current packs/day: 0.00     Types: Cigarettes, Vaping Device     Last attempt to quit: 2/15/2016     Years since quittin.9    Smokeless tobacco: Never    Tobacco comments:     tryiing to quit   Substance Use Topics    Alcohol use: No     Alcohol/week: 0.0 standard drinks of alcohol    Drug use: Not Currently     Comment: heroin      A medically appropriate review of systems was performed with pertinent positives and negatives noted in the HPI, and all other systems negative.    Physical Exam   BP: 95/59  Pulse: 90  Temp: 98.1  F (36.7  C)  Resp: 16  Height: 167.6 cm (5' 6\")  Weight: 55.5 kg (122 lb 6.4 oz)  SpO2: 100 %  Physical Exam  Constitutional:       General: She is not in acute distress.     Appearance: Normal appearance. She is not toxic-appearing.   HENT:      Head: Atraumatic.   Eyes:      General: No scleral icterus.     Conjunctiva/sclera: Conjunctivae normal.   Cardiovascular:      Rate and Rhythm: Normal rate.      Heart sounds: Normal heart sounds.   Pulmonary:      Effort: No respiratory distress.      Breath sounds: Normal breath sounds.   Abdominal:      Palpations: Abdomen is soft.      Tenderness: There is no abdominal tenderness.   Musculoskeletal:         General: No deformity.      Cervical back: Neck supple.   Skin:     General: " Skin is warm.      Findings: No rash.   Neurological:      Mental Status: She is alert.           ED Course, Procedures, & Data      Procedures         -----  Initiation of Medication for the Treatment of Opioid Use Disorder (OUD) in the Emergency Department (ED)  [facility] code      Assessment:   Opioid(s) used: heroin   Had been abstinant for one week since Brixadi in the ED one week ago  Other substance(s) with ongoing use:  none    The patient meets the following DSM-V criteria for Opioid Use Disorder (OUD):   Taking more than was intended  Persistent desire or unsuccessful efforts to cut down  Craving  Failure to fulfill obligations at work, school, or home  Continued use despite a problem caused or worsened by opioids  Tolerance  Withdrawal    (Severity: Mild: 2-3 criteria, Moderate: 4-5 criteria. Severe: 6 or more criteria)  Based on my assessment, the patient has Severe OUD.     Medication Initiated in the ED:  In the ED, treatment for OUD was initiated. The patient was given 32 mg of brixaxi  while in the ED.     Upon ED discharge, outpatient prescription treatment for OUD was initiated.   The patient was prescribed Suboxone.      Referral to Ongoing Care and Supportive Services:   Upon ED discharge, the patient was referred to Addiction Medicine for ongoing care and supportive services. The patient was also provided with information on community resources for various supportive services for OUD, and advised to return to the ED if having worsening symptoms.   -----           No results found for any visits on 01/28/25.  Medications   buprenorphine ER (BRIXADI WEEKLY) 32 MG/0.64ML subcutaneous injection 32 mg (32 mg Subcutaneous $Given 1/28/25 0302)     Labs Ordered and Resulted from Time of ED Arrival to Time of ED Departure - No data to display  No orders to display          Critical care was not performed.     Medical Decision Making  The patient's presentation was of low complexity (a stable chronic  illness).    The patient's evaluation involved:  review of external note(s) from 3+ sources (previous notes)    The patient's management necessitated moderate risk (prescription drug management including medications given in the ED).    Assessment & Plan    The patient did follow-up at [facility] following her last visit, is in the process of getting insurance coverage so she can continue to get long-acting buprenorphine injections.  She is here because her insurance has not gone through yet, but she anticipates is going through within the next week.  She does have some ongoing nausea, ongoing withdrawal, but states her cravings have improved.  She has been using the Suboxone on and off since her last visit.  She initially declined other medications aside from the Brixadi, but ultimately did ask for refill of the Suboxone as well as Zofran and hydroxyzine.  She was given 32 mg of Brixadi injection, per her previous dosage.  She is strong encouraged to follow-up with the recovery clinic, given this information as well.  I did express that future injections need to happen through recovery clinic or [facility].  I did commend her on her abstinence, and encourage her ongoing journey.    Dictation Disclaimer: Some of this Note has been completed with voice-recognition dictation software. Although errors are generally corrected real-time, there is the potential for a rare error to be present in the completed chart.      I have reviewed the nursing notes. I have reviewed the findings, diagnosis, plan and need for follow up with the patient.    New Prescriptions    BUPRENORPHINE HCL-NALOXONE HCL (SUBOXONE) 8-2 MG PER FILM    Place 1 Film under the tongue daily.    HYDROXYZINE HCL (ATARAX) 25 MG TABLET    Take 1-2 tablets (25-50 mg) by mouth 3 times daily as needed for anxiety.    ONDANSETRON (ZOFRAN ODT) 8 MG ODT TAB    Take 1 tablet (8 mg) by mouth every 8 hours as needed for nausea.       Final diagnoses:   Opioid use disorder        Aracelis Avalos  [facility] EMERGENCY DEPARTMENT  1/28/2025     Aracelis Avalos MD  01/28/25 0354

## 2025-01-28 NOTE — ED TRIAGE NOTES
Pt hear for shot for opioid addiction.      Triage Assessment (Adult)       Row Name 01/28/25 0242          Triage Assessment    Airway WDL WDL        Respiratory WDL    Respiratory WDL WDL        Skin Circulation/Temperature WDL    Skin Circulation/Temperature WDL WDL        Cardiac WDL    Cardiac WDL WDL        Peripheral/Neurovascular WDL    Peripheral Neurovascular WDL WDL        Cognitive/Neuro/Behavioral WDL    Cognitive/Neuro/Behavioral WDL WDL

## 2025-01-28 NOTE — DISCHARGE INSTRUCTIONS
Follow up with your addiction specialist as scheduled for further dosing recommendations and medication refills.     [facility]  [address], [address]  [place] [address]  Phone: [phone]  Fax:  [phone]       Hours:       Monday, Wednesday, Friday     Scheduled visits: 9:00 am - 4:00 pm     Walk-in hours:  9:00 am - 3:00 pm        Tuesday, Thursday     Walk-in only hours: 1:30 pm - 4:00 pm

## 2025-03-08 ENCOUNTER — HOSPITAL ENCOUNTER (EMERGENCY)
Facility: CLINIC | Age: 50
Discharge: HOME | End: 2025-03-09
Attending: EMERGENCY MEDICINE | Admitting: EMERGENCY MEDICINE

## 2025-03-08 DIAGNOSIS — R41.82 ALTERED MENTAL STATUS, UNSPECIFIED ALTERED MENTAL STATUS TYPE: ICD-10-CM

## 2025-03-08 DIAGNOSIS — F11.20 OPIOID USE DISORDER, SEVERE, DEPENDENCE (H): ICD-10-CM

## 2025-03-08 LAB
ALBUMIN SERPL BCG-MCNC: 4.1 G/DL (ref 3.5–5.2)
ALP SERPL-CCNC: 71 U/L (ref 40–150)
ALT SERPL W P-5'-P-CCNC: 34 U/L (ref 0–50)
AMPHETAMINES UR QL SCN: ABNORMAL
ANION GAP SERPL CALCULATED.3IONS-SCNC: 12 MMOL/L (ref 7–15)
AST SERPL W P-5'-P-CCNC: 28 U/L (ref 0–45)
BARBITURATES UR QL SCN: ABNORMAL
BASOPHILS # BLD AUTO: 0 10E3/UL (ref 0–0.2)
BASOPHILS NFR BLD AUTO: 0 %
BENZODIAZ UR QL SCN: ABNORMAL
BILIRUB SERPL-MCNC: 0.2 MG/DL
BUN SERPL-MCNC: 14.6 MG/DL (ref 6–20)
BZE UR QL SCN: ABNORMAL
CALCIUM SERPL-MCNC: 9.4 MG/DL (ref 8.8–10.4)
CANNABINOIDS UR QL SCN: ABNORMAL
CHLORIDE SERPL-SCNC: 106 MMOL/L (ref 98–107)
CREAT SERPL-MCNC: 0.61 MG/DL (ref 0.51–0.95)
EGFRCR SERPLBLD CKD-EPI 2021: >90 ML/MIN/1.73M2
EOSINOPHIL # BLD AUTO: 0 10E3/UL (ref 0–0.7)
EOSINOPHIL NFR BLD AUTO: 0 %
ERYTHROCYTE [DISTWIDTH] IN BLOOD BY AUTOMATED COUNT: 13 % (ref 10–15)
ETHANOL SERPL-MCNC: <0.01 G/DL
FENTANYL UR QL: ABNORMAL
GLUCOSE BLDC GLUCOMTR-MCNC: 106 MG/DL (ref 70–99)
GLUCOSE SERPL-MCNC: 103 MG/DL (ref 70–99)
HCG SERPL QL: NEGATIVE
HCO3 SERPL-SCNC: 26 MMOL/L (ref 22–29)
HCT VFR BLD AUTO: 39.4 % (ref 35–47)
HGB BLD-MCNC: 12.5 G/DL (ref 11.7–15.7)
IMM GRANULOCYTES # BLD: 0 10E3/UL
IMM GRANULOCYTES NFR BLD: 0 %
LYMPHOCYTES # BLD AUTO: 0.7 10E3/UL (ref 0.8–5.3)
LYMPHOCYTES NFR BLD AUTO: 7 %
MCH RBC QN AUTO: 27.4 PG (ref 26.5–33)
MCHC RBC AUTO-ENTMCNC: 31.7 G/DL (ref 31.5–36.5)
MCV RBC AUTO: 86 FL (ref 78–100)
MONOCYTES # BLD AUTO: 0.3 10E3/UL (ref 0–1.3)
MONOCYTES NFR BLD AUTO: 3 %
NEUTROPHILS # BLD AUTO: 8.6 10E3/UL (ref 1.6–8.3)
NEUTROPHILS NFR BLD AUTO: 89 %
NRBC # BLD AUTO: 0 10E3/UL
NRBC BLD AUTO-RTO: 0 /100
OPIATES UR QL SCN: ABNORMAL
PCP QUAL URINE (ROCHE): ABNORMAL
PLATELET # BLD AUTO: 316 10E3/UL (ref 150–450)
POTASSIUM SERPL-SCNC: 3.9 MMOL/L (ref 3.4–5.3)
PROT SERPL-MCNC: 7.4 G/DL (ref 6.4–8.3)
RBC # BLD AUTO: 4.57 10E6/UL (ref 3.8–5.2)
SODIUM SERPL-SCNC: 144 MMOL/L (ref 135–145)
WBC # BLD AUTO: 9.6 10E3/UL (ref 4–11)

## 2025-03-08 PROCEDURE — 82962 GLUCOSE BLOOD TEST: CPT

## 2025-03-08 PROCEDURE — 99284 EMERGENCY DEPT VISIT MOD MDM: CPT | Performed by: EMERGENCY MEDICINE

## 2025-03-08 PROCEDURE — 99284 EMERGENCY DEPT VISIT MOD MDM: CPT | Mod: 25 | Performed by: EMERGENCY MEDICINE

## 2025-03-08 PROCEDURE — 82310 ASSAY OF CALCIUM: CPT | Performed by: EMERGENCY MEDICINE

## 2025-03-08 PROCEDURE — 96360 HYDRATION IV INFUSION INIT: CPT | Performed by: EMERGENCY MEDICINE

## 2025-03-08 PROCEDURE — 84703 CHORIONIC GONADOTROPIN ASSAY: CPT | Performed by: EMERGENCY MEDICINE

## 2025-03-08 PROCEDURE — G2213 INITIAT MED ASSIST TX IN ER: HCPCS | Performed by: EMERGENCY MEDICINE

## 2025-03-08 PROCEDURE — 82077 ASSAY SPEC XCP UR&BREATH IA: CPT | Performed by: EMERGENCY MEDICINE

## 2025-03-08 PROCEDURE — 258N000003 HC RX IP 258 OP 636: Performed by: EMERGENCY MEDICINE

## 2025-03-08 PROCEDURE — 80307 DRUG TEST PRSMV CHEM ANLYZR: CPT | Performed by: EMERGENCY MEDICINE

## 2025-03-08 PROCEDURE — 85004 AUTOMATED DIFF WBC COUNT: CPT | Performed by: EMERGENCY MEDICINE

## 2025-03-08 PROCEDURE — 36415 COLL VENOUS BLD VENIPUNCTURE: CPT | Performed by: EMERGENCY MEDICINE

## 2025-03-08 RX ADMIN — SODIUM CHLORIDE 1000 ML: 9 INJECTION, SOLUTION INTRAVENOUS at 21:59

## 2025-03-08 ASSESSMENT — LIFESTYLE VARIABLES
HEADACHE, FULLNESS IN HEAD: NOT PRESENT
ORIENTATION AND CLOUDING OF SENSORIUM: DISORIENTED FOR DATE BY MORE THAN 2 CALENDAR DAYS
AUDITORY DISTURBANCES: NOT PRESENT
TOTAL SCORE: 3
NAUSEA AND VOMITING: NO NAUSEA AND NO VOMITING
VISUAL DISTURBANCES: NOT PRESENT
TREMOR: NO TREMOR
ANXIETY: NO ANXIETY, AT EASE
PAROXYSMAL SWEATS: NO SWEAT VISIBLE
AGITATION: NORMAL ACTIVITY

## 2025-03-08 ASSESSMENT — ACTIVITIES OF DAILY LIVING (ADL)
ADLS_ACUITY_SCORE: 41
ADLS_ACUITY_SCORE: 41

## 2025-03-09 VITALS
RESPIRATION RATE: 16 BRPM | HEART RATE: 83 BPM | TEMPERATURE: 99.3 F | SYSTOLIC BLOOD PRESSURE: 117 MMHG | DIASTOLIC BLOOD PRESSURE: 72 MMHG | OXYGEN SATURATION: 100 %

## 2025-03-09 PROCEDURE — 250N000013 HC RX MED GY IP 250 OP 250 PS 637: Performed by: FAMILY MEDICINE

## 2025-03-09 PROCEDURE — 96372 THER/PROPH/DIAG INJ SC/IM: CPT

## 2025-03-09 PROCEDURE — 250N000011 HC RX IP 250 OP 636: Performed by: FAMILY MEDICINE

## 2025-03-09 RX ORDER — CLONIDINE HYDROCHLORIDE 0.1 MG/1
0.1 TABLET ORAL ONCE
Status: COMPLETED | OUTPATIENT
Start: 2025-03-09 | End: 2025-03-09

## 2025-03-09 RX ORDER — ONDANSETRON 4 MG/1
4 TABLET, ORALLY DISINTEGRATING ORAL ONCE
Status: COMPLETED | OUTPATIENT
Start: 2025-03-09 | End: 2025-03-09

## 2025-03-09 RX ORDER — HYDROXYZINE HYDROCHLORIDE 25 MG/1
25 TABLET, FILM COATED ORAL ONCE
Status: COMPLETED | OUTPATIENT
Start: 2025-03-09 | End: 2025-03-09

## 2025-03-09 RX ORDER — BUPRENORPHINE AND NALOXONE 8; 2 MG/1; MG/1
1 FILM, SOLUBLE BUCCAL; SUBLINGUAL DAILY
Qty: 30 FILM | Refills: 0 | Status: SHIPPED | OUTPATIENT
Start: 2025-03-09

## 2025-03-09 RX ORDER — GABAPENTIN 100 MG/1
100 CAPSULE ORAL 3 TIMES DAILY
Status: DISCONTINUED | OUTPATIENT
Start: 2025-03-09 | End: 2025-03-09 | Stop reason: HOSPADM

## 2025-03-09 RX ADMIN — CLONIDINE HYDROCHLORIDE 0.1 MG: 0.1 TABLET ORAL at 10:44

## 2025-03-09 RX ADMIN — GABAPENTIN 100 MG: 100 CAPSULE ORAL at 19:34

## 2025-03-09 RX ADMIN — ONDANSETRON 4 MG: 4 TABLET, ORALLY DISINTEGRATING ORAL at 16:38

## 2025-03-09 RX ADMIN — HYDROXYZINE HYDROCHLORIDE 25 MG: 25 TABLET, FILM COATED ORAL at 16:38

## 2025-03-09 RX ADMIN — ONDANSETRON 4 MG: 4 TABLET, ORALLY DISINTEGRATING ORAL at 10:44

## 2025-03-09 ASSESSMENT — ACTIVITIES OF DAILY LIVING (ADL)
ADLS_ACUITY_SCORE: 41

## 2025-03-09 ASSESSMENT — LIFESTYLE VARIABLES
TOTAL_SCORE: 5
TOTAL_SCORE: 4

## 2025-03-09 NOTE — ED PROVIDER NOTES
"Emergency Department I-PASS Sign-out      Illness Severity: \"Watcher\"    Patient Summary:  49 year old female with pertinent PMH of polysubstance abuse who presented with substance abuse    ED Course/treatment plan: Patient with concern for drug abuse. Patient quite sedate here.  Multiple positives on urine drug screen.  Labs are reassuring.  Vitals have been reassuring.  No indication of head imaging.  Patient is able to participate in questioning but quickly falls asleep.  Initially stated they wanted a break [place] according to the partner however patient has no evidence of withdrawal at this point    Clinical Impression:  No diagnosis found.    Edited by: Ankur Drummond MD at 3/9/2025 0039    Action List:  -To do:  Reassess when more alert    Situational Awareness & Contingency Planning: Would avoid giving Brixadi or Suboxone here due to concern for precipitated withdrawal but would recommend prescribing Suboxone if wanted  Code Status (Most recent):  No Order    Disposition:  likely to be discharged once more sober    Edited by: Ankur Drummond MD at 3/9/2025 0039    Synthesis & Events after sign-out:  Patient slept for the majority of the day.  We did a COWS score it was 5.  We discussed the possibility of taking Brixadi, patient fears precipitated withdrawal, signout was concern for potentiating precipitated withdrawal.  Ultimately in discussion with the patient we elected to give her a prescription for Suboxone and Narcan to take home so that she can start Suboxone when she is more appropriate the and significant opioid withdrawal.  She will follow-up with the recovery clinic  After discharge significant other expressed real desire to have this patient received Brixadi.  She would like to wait another hour or 2, I gave comfort meds and will have her reassessed around 6:00.  At that point she potentially could have Brixadi.  Signing out at shift change      Aaron Crum MD   Emergency " Medicine     Aaron Crum MD  03/09/25 1458       Aaron Crum MD  03/09/25 5712

## 2025-03-09 NOTE — ED NOTES
Pt obtunded responds to painful stimuli only as of this time. Pt protecting own airway. O2 sat >95% RA.   PIV R lower FA G 18 - SL as of this time.  Pt's spouse at BS with their support dog at BS.  On cardiac monitor: SR.  Max assist x 2 with transfer.

## 2025-03-09 NOTE — ED NOTES
Received patient's care at 0300. Pt is somnolent and unable to participate assessment questionnaire,but denies pain and discomfort.Vitals obtained and stable.Cardiac monitor in place. Significant other at bedside.

## 2025-03-09 NOTE — ED NOTES
Pt is obtunded. Does not respond to verbal stimuli. Will respond to painful stimuli. Vitals reassessed and documented. On room air. On tele and continuous pulse ox.  and dog present at the bedside.

## 2025-03-09 NOTE — ED PROVIDER NOTES
[facility] [place] ([facility])    3/08/25       ED PROVIDER NOTE    History     Chief Complaint   Patient presents with    Drug / Alcohol Assessment    Withdrawal     HPI  Tiffani Grubbs is a 49 year old female with a past medical history of opioid dependence, opioid withdrawal, nicotine addiction, depression, crohn's disease, and PTSD, who presents to the ED for evaluation of drug/alcohol assessment. Patient's  reports the HPI as the patient was asleep. Patient has been using suboxone strips for her withdrawals. Patient last used opioids 3 days ago and reportedly last had suboxone at 5 PM today. Patient is out of her is reportedly out of her medication. Patient's  states he got a call to bring his wife to the hospital. Patient's  states the patient is exhausted which is why she is so sleepy. Patient's  denies use of alcohol.     Past Medical History  Past Medical History:   Diagnosis Date    Anxiety     Chronic pain     Crohn's     Depression     Salivary gland calculi      Past Surgical History:   Procedure Laterality Date     SECTION  3-3-03    CHOLECYSTECTOMY       ALPRAZolam (XANAX) 0.5 MG tablet  buprenorphine (SUBUTEX) 8 MG SUBL sublingual tablet  buprenorphine HCl-naloxone HCl (SUBOXONE) 8-2 MG per film  Cholecalciferol ([employer] D3) 1000 UNITS CAPS  diphenoxylate-atropine (LOMOTIL) 2.5-0.025 MG per tablet  escitalopram (LEXAPRO) 10 MG tablet  fentaNYL (DURAGESIC) 25 mcg/hr 72 hr patch  fentaNYL (DURAGESIC) 50 mcg/hr 72 hr patch  hydrOXYzine HCl (ATARAX) 25 MG tablet  hydrOXYzine HCl (ATARAX) 25 MG tablet  naloxone (NARCAN) 4 MG/0.1ML nasal spray  ondansetron (ZOFRAN ODT) 4 MG ODT tab  oxyCODONE-acetaminophen (PERCOCET) 5-325 MG per tablet  prochlorperazine (COMPAZINE) 5 MG tablet      Allergies   Allergen Reactions    Doxycycline      Blistering / peeling rash of face    Morphine And Codeine Nausea    Sympathomimetics Other (See Comments)      Fast heart beat     Family History  Family History   Problem Relation Age of Onset    C.A.D. Unknown     Cancer Unknown         melanoma     Social History   Social History     Tobacco Use    Smoking status: Every Day     Current packs/day: 0.00     Types: Cigarettes, Vaping Device     Last attempt to quit: 2/15/2016     Years since quittin.0    Smokeless tobacco: Never    Tobacco comments:     tryiing to quit   Substance Use Topics    Alcohol use: No     Alcohol/week: 0.0 standard drinks of alcohol    Drug use: Not Currently     Comment: heroin      A complete review of systems was performed with pertinent positives and negatives noted in the HPI, and all other systems negative.    Physical Exam   BP: 102/64  Pulse: 106  Temp: 98.3  F (36.8  C)  Resp: 15  SpO2: 97 %  Physical Exam  Physical Exam   Constitutional: moderately sedated.   HENT:   Head: Normocephalic and atraumatic. PERRL 3mm bilaterally.  Neck: Normal range of motion.   Pulmonary/Chest: Effort normal. No respiratory distress.   Cardiac: No murmurs, rubs, gallops. RRR.  Abdominal: Abdomen soft, nontender, nondistended. No rebound tenderness.  MSK: Long bones without deformity or evidence of trauma  Neurological: Slurred speech. Moving all extremities  Skin: Skin is warm and dry.   Psychiatric:  normal mood and affect.  behavior is normal. Thought content normal.     ED Course, Procedures, & Data      Procedures            Results for orders placed or performed during the hospital encounter of 25   Comprehensive metabolic panel     Status: Abnormal   Result Value Ref Range    Sodium 144 135 - 145 mmol/L    Potassium 3.9 3.4 - 5.3 mmol/L    Carbon Dioxide (CO2) 26 22 - 29 mmol/L    Anion Gap 12 7 - 15 mmol/L    Urea Nitrogen 14.6 6.0 - 20.0 mg/dL    Creatinine 0.61 0.51 - 0.95 mg/dL    GFR Estimate >90 >60 mL/min/1.73m2    Calcium 9.4 8.8 - 10.4 mg/dL    Chloride 106 98 - 107 mmol/L    Glucose 103 (H) 70 - 99 mg/dL    Alkaline Phosphatase 71 40  - 150 U/L    AST 28 0 - 45 U/L    ALT 34 0 - 50 U/L    Protein Total 7.4 6.4 - 8.3 g/dL    Albumin 4.1 3.5 - 5.2 g/dL    Bilirubin Total 0.2 <=1.2 mg/dL   Alcohol level blood     Status: Normal   Result Value Ref Range    Alcohol ethyl <0.01 <=0.01 g/dL   HCG qualitative pregnancy (blood)     Status: Normal   Result Value Ref Range    hCG Serum Qualitative Negative Negative   Glucose by meter     Status: Abnormal   Result Value Ref Range    GLUCOSE BY METER POCT 106 (H) 70 - 99 mg/dL   CBC with platelets and differential     Status: Abnormal   Result Value Ref Range    WBC Count 9.6 4.0 - 11.0 10e3/uL    RBC Count 4.57 3.80 - 5.20 10e6/uL    Hemoglobin 12.5 11.7 - 15.7 g/dL    Hematocrit 39.4 35.0 - 47.0 %    MCV 86 78 - 100 fL    MCH 27.4 26.5 - 33.0 pg    MCHC 31.7 31.5 - 36.5 g/dL    RDW 13.0 10.0 - 15.0 %    Platelet Count 316 150 - 450 10e3/uL    % Neutrophils 89 %    % Lymphocytes 7 %    % Monocytes 3 %    % Eosinophils 0 %    % Basophils 0 %    % Immature Granulocytes 0 %    NRBCs per 100 WBC 0 <1 /100    Absolute Neutrophils 8.6 (H) 1.6 - 8.3 10e3/uL    Absolute Lymphocytes 0.7 (L) 0.8 - 5.3 10e3/uL    Absolute Monocytes 0.3 0.0 - 1.3 10e3/uL    Absolute Eosinophils 0.0 0.0 - 0.7 10e3/uL    Absolute Basophils 0.0 0.0 - 0.2 10e3/uL    Absolute Immature Granulocytes 0.0 <=0.4 10e3/uL    Absolute NRBCs 0.0 10e3/uL   Urine Drug Screen Panel     Status: Abnormal   Result Value Ref Range    Amphetamines Urine Screen Positive (A) Screen Negative    Barbituates Urine Screen Negative Screen Negative    Benzodiazepine Urine Screen Negative Screen Negative    Cannabinoids Urine Screen Positive (A) Screen Negative    Cocaine Urine Screen Negative Screen Negative    Fentanyl Qual Urine Screen Positive (A) Screen Negative    Opiates Urine Screen Negative Screen Negative    PCP Urine Screen Negative Screen Negative   CBC with platelets differential     Status: Abnormal    Narrative    The following orders were created for  panel order CBC with platelets differential.  Procedure                               Abnormality         Status                     ---------                               -----------         ------                     CBC with platelets and ...[[id number]]  Abnormal            Final result                 Please view results for these tests on the individual orders.   Urine Drug Screen     Status: Abnormal    Narrative    The following orders were created for panel order Urine Drug Screen.  Procedure                               Abnormality         Status                     ---------                               -----------         ------                     Urine Drug Screen Panel[[id number]]     Abnormal            Final result                 Please view results for these tests on the individual orders.     Medications - No data to display  Labs Ordered and Resulted from Time of ED Arrival to Time of ED Departure - No data to display  No orders to display          Critical Care Addendum  My initial assessment, based on my focused history and physical exam, established a high suspicion that Tiffani Grubbs has altered mental status, which requires immediate intervention, and therefore she is critically ill.     After the initial assessment, the care team initiated multiple lab tests and initiated IV fluid administration to provide stabilization care. Due to the critical nature of this patient, I reassessed vital signs, physical exam, and neurologic status multiple times prior to her disposition.     Time also spent performing documentation and reviewing test results.     Critical care time (excluding teaching time and procedures): 30 minutes.       Assessment & Plan    Patient here with altered mental status likely related to drug use.  Patient has been noted to be using again.  Here she is able to have a conversation but immediately falls asleep.  Vitals here are stable and labs are reassuring.  No signs  of trauma.  She is moving all extremities.  Would not recommend starting Brixadi or Suboxone loss starting to withdraw however likely will need a Suboxone refill upon sobering if desired.    I have reviewed the nursing notes. I have reviewed the findings, diagnosis, plan and need for follow up with the patient.    New Prescriptions    No medications on file       Final diagnoses:   Altered mental status, unspecified altered mental status type     I, Jasmyne Yanes, am serving as a trained medical scribe to document services personally performed by Ankur Drummond MD based on the provider's statements to me on March 8, 2025.  This document has been checked and approved by the attending provider.    I, Ankur Drummond MD, was physically present and have reviewed and verified the accuracy of this note documented by Jasmyne Yanes, medical scribe.      Ankur Drummond MD  [facility] EMERGENCY DEPARTMENT  3/8/2025     Ankur Drummond MD  03/09/25 0044

## 2025-03-09 NOTE — DISCHARGE INSTRUCTIONS
Buprenorphine Self Start:     1) Take a day off and have a place to rest.  2) Stop using, and wait until you feel very sick from withdrawals, at least 24 hours since last use. If you can wait longer, up to 3 days is ideal. Use the additional medications provided to manage the symptoms of withdrawal.   3) Dose two 8mg films or tablets under your tongue (first dose 16mg).   4) Take another one or two 8mg films or tablets an hour later to feel well. If you feel worse after your first 2 strips, you can take these right away. No more than 4 strips (32mg) each day.   5) The next day, take 2-3 films or tablets (16-24 mg) at once.    6) The following days, continue taking 2-3 films or tablets every day to control withdrawal symptoms and cravings.    [facility], [phone], [address] [place] [address], [facility] ([address], first [facility])    Buprenorphine (Suboxone) Home Induction Instructions  (instructions adapted from [facility])      Plan to take a day off and have a place to rest.     Stop using and wait until you feel very sick from the withdrawals (at least 12 hours is best, if using fentanyl it may take a few days). You should have at least 3 of the following:  bad chills or sweating  heavy yawning  joint/bone aches  enlarged pupils  runny nose or watery eyes  feeling restless  goose bumps  anxious or irritable  cramps, nausea, vomiting or diarrhea  twitching/tremors/shakes    Dose one or two 8 mg tablets or strips under your tongue (total dose of 8-16 mg).     Make sure the tablet or film fully dissolves under your tongue (takes about 15 minutes). The medication will not work as well if swallowed into the stomach.     After an hour, repeat the dose (another 8-16 mg) to feel well.     The next day, take 16-32 mg (2-4 tablets or films) at one time.          --  If you have started buprenorphine before:  If it went well, that's great! Just do that again.   If it was difficult,  "talk with your care team to figure out what happened and find ways to make it better this time. You may need a different dosing plan than what is listed here.     If you have never started buprenorphine before:  Gather your support team and if possible take a day off.   You are going to want space to rest. Don't drive.   Using cocaine, methamphetamine, alcohol, or pills makes starting Suboxone/buprenorphine harder, and mixing in alcohol or benzodiazepines can be dangerous.     --  If you have a light habit (e.g. 5 \"Norco 10's\" per day):  Consider a low dose: start with 4 mg and stop and 8 mg total.   WARNING: Withdrawal will continue if you don't take enough buprenorphine.     If you have a heavy habit (e.g. injecting 2 g heroin per day or smoking 1 g fentanyl per day)  Consider a high dose: start with a first dose of 16 mg  For most people, the effects of buprenorphine max out at around 24-32 mg.   WARNING: Too much buprenorphine can make you feel sick and sleepy.    --  Follow up with your addiction specialist as scheduled for further dosing recommendations and medication refills.     [facility]  [address] [place] [address], [address]  [place] [address]  Phone: [phone]  Fax:  [phone]       Hours:       Monday, Wednesday, Friday     Scheduled visits: 9:00 am - 4:00 pm     Walk-in hours:  9:00 am - 3:00 pm        Tuesday, Thursday     Walk-in only hours: 1:30 pm - 4:00 pm        "

## 2025-03-09 NOTE — ED TRIAGE NOTES
"Pt's significant other brought her to the hospital because he believes she \"ran out of sublicate\". Pt having trouble with getting insurance to cover medication. Pt has hx of opiate use, pt reported using opiates \"a few days ago\".    Pt had slurred speech in triage and needed to be asked questions multiple times for an answer.       "

## 2025-03-10 NOTE — ED PROVIDER NOTES
This patient was signed over to me by my colleague pending reassessment of opiate withdrawal symptoms.  She did have slight increase in opiate withdrawal symptoms.  The patient and her significant other are quite adamant to receive Brixadi medication to reduce symptoms.  On my assessment I think this is reasonable.  We did discuss risks and benefits.  Medication was administered with improvement of symptoms and without worsening of withdrawal.  Patient does have a good outpatient plan in place however we are certainly happy to reevaluate her at any time should she have change, progression or worsening of symptoms.       Williams Calabrese MD  03/09/25 [id number]